# Patient Record
Sex: FEMALE | Race: WHITE | URBAN - NONMETROPOLITAN AREA
[De-identification: names, ages, dates, MRNs, and addresses within clinical notes are randomized per-mention and may not be internally consistent; named-entity substitution may affect disease eponyms.]

---

## 2023-11-05 SDOH — HEALTH STABILITY: PHYSICAL HEALTH: ON AVERAGE, HOW MANY DAYS PER WEEK DO YOU ENGAGE IN MODERATE TO STRENUOUS EXERCISE (LIKE A BRISK WALK)?: 0 DAYS

## 2023-11-05 ASSESSMENT — SOCIAL DETERMINANTS OF HEALTH (SDOH)
WITHIN THE LAST YEAR, HAVE YOU BEEN AFRAID OF YOUR PARTNER OR EX-PARTNER?: NO
WITHIN THE LAST YEAR, HAVE YOU BEEN KICKED, HIT, SLAPPED, OR OTHERWISE PHYSICALLY HURT BY YOUR PARTNER OR EX-PARTNER?: NO
WITHIN THE LAST YEAR, HAVE YOU BEEN HUMILIATED OR EMOTIONALLY ABUSED IN OTHER WAYS BY YOUR PARTNER OR EX-PARTNER?: NO
WITHIN THE LAST YEAR, HAVE TO BEEN RAPED OR FORCED TO HAVE ANY KIND OF SEXUAL ACTIVITY BY YOUR PARTNER OR EX-PARTNER?: NO

## 2023-11-07 ENCOUNTER — HOSPITAL ENCOUNTER (OUTPATIENT)
Age: 38
Discharge: HOME | End: 2023-11-10
Payer: COMMERCIAL

## 2023-11-07 ENCOUNTER — OFFICE VISIT (OUTPATIENT)
Facility: CLINIC | Age: 38
End: 2023-11-07
Payer: COMMERCIAL

## 2023-11-07 VITALS
HEART RATE: 82 BPM | TEMPERATURE: 98.3 F | RESPIRATION RATE: 16 BRPM | OXYGEN SATURATION: 98 % | HEIGHT: 66 IN | SYSTOLIC BLOOD PRESSURE: 141 MMHG | DIASTOLIC BLOOD PRESSURE: 91 MMHG | WEIGHT: 238 LBS | BODY MASS INDEX: 38.25 KG/M2

## 2023-11-07 DIAGNOSIS — Z86.39 HISTORY OF VITAMIN D DEFICIENCY: ICD-10-CM

## 2023-11-07 DIAGNOSIS — E89.0 POSTABLATIVE HYPOTHYROIDISM: ICD-10-CM

## 2023-11-07 DIAGNOSIS — I10 PRIMARY HYPERTENSION: ICD-10-CM

## 2023-11-07 DIAGNOSIS — Z13.220 SCREENING FOR CHOLESTEROL LEVEL: ICD-10-CM

## 2023-11-07 DIAGNOSIS — I10 PRIMARY HYPERTENSION: Primary | ICD-10-CM

## 2023-11-07 LAB
25(OH)D3 SERPL-MCNC: 26.1 NG/ML (ref 30–100)
ALBUMIN SERPL-MCNC: 4.1 G/DL (ref 3.4–5)
ALBUMIN/GLOB SERPL: 1.3 (ref 0.8–1.7)
ALP SERPL-CCNC: 86 U/L (ref 45–117)
ALT SERPL-CCNC: 27 U/L (ref 13–56)
ANION GAP SERPL CALC-SCNC: 6 MMOL/L (ref 3–18)
AST SERPL W P-5'-P-CCNC: 16 U/L (ref 10–38)
BASOPHILS # BLD: 0.1 K/UL (ref 0–0.1)
BASOPHILS NFR BLD: 1 % (ref 0–2)
BILIRUB SERPL-MCNC: 0.5 MG/DL (ref 0.2–1)
BUN SERPL-MCNC: 13 MG/DL (ref 7–18)
BUN/CREAT SERPL: 15 (ref 12–20)
CA-I BLD-MCNC: 9 MG/DL (ref 8.5–10.1)
CHLORIDE SERPL-SCNC: 104 MMOL/L (ref 100–111)
CHOLEST SERPL-MCNC: 183 MG/DL
CO2 SERPL-SCNC: 30 MMOL/L (ref 21–32)
CREAT SERPL-MCNC: 0.88 MG/DL (ref 0.6–1.3)
DIFFERENTIAL METHOD BLD: ABNORMAL
EOSINOPHIL # BLD: 0.1 K/UL (ref 0–0.4)
EOSINOPHIL NFR BLD: 1 % (ref 0–5)
ERYTHROCYTE [DISTWIDTH] IN BLOOD BY AUTOMATED COUNT: 13 % (ref 11.6–14.5)
GLOBULIN SER CALC-MCNC: 3.2 G/DL (ref 2–4)
GLUCOSE SERPL-MCNC: 103 MG/DL (ref 74–99)
HCT VFR BLD AUTO: 39 % (ref 35–45)
HDLC SERPL-MCNC: 50 MG/DL (ref 40–60)
HDLC SERPL: 3.7 (ref 0–5)
HGB BLD-MCNC: 13.3 G/DL (ref 12–16)
IMM GRANULOCYTES # BLD AUTO: 0 K/UL (ref 0–0.04)
IMM GRANULOCYTES NFR BLD AUTO: 0 % (ref 0–0.5)
LDLC SERPL CALC-MCNC: 102.6 MG/DL (ref 0–100)
LIPID PANEL: ABNORMAL
LYMPHOCYTES # BLD: 2.7 K/UL (ref 0.9–3.6)
LYMPHOCYTES NFR BLD: 32 % (ref 21–52)
MCH RBC QN AUTO: 30.2 PG (ref 24–34)
MCHC RBC AUTO-ENTMCNC: 34.1 G/DL (ref 31–37)
MCV RBC AUTO: 88.6 FL (ref 78–100)
MONOCYTES # BLD: 0.6 K/UL (ref 0.05–1.2)
MONOCYTES NFR BLD: 8 % (ref 3–10)
NEUTS SEG # BLD: 4.8 K/UL (ref 1.8–8)
NEUTS SEG NFR BLD: 58 % (ref 40–73)
NRBC # BLD: 0 K/UL (ref 0–0.01)
NRBC BLD-RTO: 0 PER 100 WBC
PLATELET # BLD AUTO: 218 K/UL (ref 135–420)
PMV BLD AUTO: 12.1 FL (ref 9.2–11.8)
POTASSIUM SERPL-SCNC: 3.9 MMOL/L (ref 3.5–5.5)
PROT SERPL-MCNC: 7.3 G/DL (ref 6.4–8.2)
RBC # BLD AUTO: 4.4 M/UL (ref 4.2–5.3)
SODIUM SERPL-SCNC: 140 MMOL/L (ref 136–145)
TRIGL SERPL-MCNC: 152 MG/DL
TSH SERPL DL<=0.05 MIU/L-ACNC: 0.19 UIU/ML (ref 0.36–3.74)
VLDLC SERPL CALC-MCNC: 30.4 MG/DL
WBC # BLD AUTO: 8.3 K/UL (ref 4.6–13.2)

## 2023-11-07 PROCEDURE — 85025 COMPLETE CBC W/AUTO DIFF WBC: CPT

## 2023-11-07 PROCEDURE — 3080F DIAST BP >= 90 MM HG: CPT | Performed by: NURSE PRACTITIONER

## 2023-11-07 PROCEDURE — 84443 ASSAY THYROID STIM HORMONE: CPT

## 2023-11-07 PROCEDURE — 80061 LIPID PANEL: CPT

## 2023-11-07 PROCEDURE — 82306 VITAMIN D 25 HYDROXY: CPT

## 2023-11-07 PROCEDURE — 99204 OFFICE O/P NEW MOD 45 MIN: CPT | Performed by: NURSE PRACTITIONER

## 2023-11-07 PROCEDURE — 1036F TOBACCO NON-USER: CPT | Performed by: NURSE PRACTITIONER

## 2023-11-07 PROCEDURE — G8427 DOCREV CUR MEDS BY ELIG CLIN: HCPCS | Performed by: NURSE PRACTITIONER

## 2023-11-07 PROCEDURE — 80053 COMPREHEN METABOLIC PANEL: CPT

## 2023-11-07 PROCEDURE — 36415 COLL VENOUS BLD VENIPUNCTURE: CPT

## 2023-11-07 PROCEDURE — 3077F SYST BP >= 140 MM HG: CPT | Performed by: NURSE PRACTITIONER

## 2023-11-07 PROCEDURE — G8419 CALC BMI OUT NRM PARAM NOF/U: HCPCS | Performed by: NURSE PRACTITIONER

## 2023-11-07 PROCEDURE — G8484 FLU IMMUNIZE NO ADMIN: HCPCS | Performed by: NURSE PRACTITIONER

## 2023-11-07 RX ORDER — LOSARTAN POTASSIUM 25 MG/1
25 TABLET ORAL DAILY
Qty: 30 TABLET | Refills: 0 | Status: SHIPPED | OUTPATIENT
Start: 2023-11-07 | End: 2023-11-07

## 2023-11-07 RX ORDER — LOSARTAN POTASSIUM 25 MG/1
25 TABLET ORAL DAILY
Qty: 90 TABLET | Refills: 1 | Status: SHIPPED | OUTPATIENT
Start: 2023-11-07

## 2023-11-07 SDOH — ECONOMIC STABILITY: FOOD INSECURITY: WITHIN THE PAST 12 MONTHS, THE FOOD YOU BOUGHT JUST DIDN'T LAST AND YOU DIDN'T HAVE MONEY TO GET MORE.: NEVER TRUE

## 2023-11-07 SDOH — ECONOMIC STABILITY: INCOME INSECURITY: HOW HARD IS IT FOR YOU TO PAY FOR THE VERY BASICS LIKE FOOD, HOUSING, MEDICAL CARE, AND HEATING?: NOT VERY HARD

## 2023-11-07 SDOH — ECONOMIC STABILITY: FOOD INSECURITY: WITHIN THE PAST 12 MONTHS, YOU WORRIED THAT YOUR FOOD WOULD RUN OUT BEFORE YOU GOT MONEY TO BUY MORE.: NEVER TRUE

## 2023-11-07 SDOH — ECONOMIC STABILITY: HOUSING INSECURITY
IN THE LAST 12 MONTHS, WAS THERE A TIME WHEN YOU DID NOT HAVE A STEADY PLACE TO SLEEP OR SLEPT IN A SHELTER (INCLUDING NOW)?: NO

## 2023-11-07 ASSESSMENT — PATIENT HEALTH QUESTIONNAIRE - PHQ9
SUM OF ALL RESPONSES TO PHQ QUESTIONS 1-9: 0
2. FEELING DOWN, DEPRESSED OR HOPELESS: 0
SUM OF ALL RESPONSES TO PHQ9 QUESTIONS 1 & 2: 0
1. LITTLE INTEREST OR PLEASURE IN DOING THINGS: 0
SUM OF ALL RESPONSES TO PHQ QUESTIONS 1-9: 0

## 2023-11-07 NOTE — PROGRESS NOTES
Caity Kaba (: 1985) is a 34 y.o. female patient, here for evaluation of the following chief complaint(s):  No chief complaint on file. ASSESSMENT/PLAN:  Below is the assessment and plan developed based on review of pertinent history, physical exam, labs, studies, and medications. Blood work ordered  [employer] new medication losartan 2-week blood pressure log we will follow-up in 2 weeks to monitor blood pressure and also response and to go over all labs  Will check TSH patient does see endocrinology to manage hypothyroidism  2-week follow-up        1. Primary hypertension  -     CBC with Auto Differential; Future  -     Comprehensive Metabolic Panel; Future  -     losartan (COZAAR) 25 MG tablet; Take 1 tablet by mouth daily, Disp-90 tablet, R-1Normal  2. Screening for cholesterol level  -     Lipid Panel; Future  3. Postablative hypothyroidism  -     TSH; Future  4. History of vitamin D deficiency  -     Vitamin D 25 Hydroxy; Future    No results found for any visits on 23. Return in about 2 weeks (around 2023). I have discussed the diagnosis with the patient and the intended plan as seen in the above orders. Questions were answered concerning future plans. I have discussed medication side effects and warnings with the patient as well. I have reviewed the plan of care with the patient, accepted their input and they are in agreement with the treatment goals. Previous lab and imaging results were reviewed by me. I have performed a medically appropriate exam, ordering tests and medications, counseling and educating, and documenting in the EMR     I recommend this patient have regular follow-up appointments every  6 months for general health evaluations and management   Sooner if indicated     Past Medical History  History reviewed. No pertinent past medical history.        SUBJECTIVE/OBJECTIVE:    HPI: 47-year-old female presents to the office to establish for primary care

## 2023-11-08 NOTE — RESULT ENCOUNTER NOTE
Lab work reviewed noted mild abnormal findings noncritical urgent  This patient has a follow-up appointment November 21 for reevaluation of blood pressure and we will discuss all labs at this time

## 2023-12-07 ENCOUNTER — OFFICE VISIT (OUTPATIENT)
Facility: CLINIC | Age: 38
End: 2023-12-07
Payer: COMMERCIAL

## 2023-12-07 VITALS
OXYGEN SATURATION: 98 % | HEIGHT: 66 IN | WEIGHT: 232.8 LBS | SYSTOLIC BLOOD PRESSURE: 130 MMHG | DIASTOLIC BLOOD PRESSURE: 84 MMHG | RESPIRATION RATE: 20 BRPM | BODY MASS INDEX: 37.41 KG/M2 | HEART RATE: 75 BPM

## 2023-12-07 DIAGNOSIS — Z13.29 SCREENING FOR ENDOCRINE DISORDER: ICD-10-CM

## 2023-12-07 DIAGNOSIS — E89.0 POSTABLATIVE HYPOTHYROIDISM: Primary | ICD-10-CM

## 2023-12-07 DIAGNOSIS — Z12.31 ENCOUNTER FOR SCREENING MAMMOGRAM FOR MALIGNANT NEOPLASM OF BREAST: ICD-10-CM

## 2023-12-07 DIAGNOSIS — R73.9 BLOOD GLUCOSE ELEVATED: ICD-10-CM

## 2023-12-07 DIAGNOSIS — I10 PRIMARY HYPERTENSION: ICD-10-CM

## 2023-12-07 LAB — HBA1C MFR BLD: 5.4 %

## 2023-12-07 PROCEDURE — 3075F SYST BP GE 130 - 139MM HG: CPT | Performed by: NURSE PRACTITIONER

## 2023-12-07 PROCEDURE — 99213 OFFICE O/P EST LOW 20 MIN: CPT | Performed by: NURSE PRACTITIONER

## 2023-12-07 PROCEDURE — 3079F DIAST BP 80-89 MM HG: CPT | Performed by: NURSE PRACTITIONER

## 2023-12-07 PROCEDURE — G8419 CALC BMI OUT NRM PARAM NOF/U: HCPCS | Performed by: NURSE PRACTITIONER

## 2023-12-07 PROCEDURE — 83036 HEMOGLOBIN GLYCOSYLATED A1C: CPT | Performed by: NURSE PRACTITIONER

## 2023-12-07 PROCEDURE — G8484 FLU IMMUNIZE NO ADMIN: HCPCS | Performed by: NURSE PRACTITIONER

## 2023-12-07 PROCEDURE — G8427 DOCREV CUR MEDS BY ELIG CLIN: HCPCS | Performed by: NURSE PRACTITIONER

## 2023-12-07 PROCEDURE — 1036F TOBACCO NON-USER: CPT | Performed by: NURSE PRACTITIONER

## 2023-12-07 RX ORDER — LEVOTHYROXINE SODIUM 0.2 MG/1
200 TABLET ORAL DAILY
COMMUNITY

## 2023-12-07 ASSESSMENT — PATIENT HEALTH QUESTIONNAIRE - PHQ9
SUM OF ALL RESPONSES TO PHQ9 QUESTIONS 1 & 2: 0
SUM OF ALL RESPONSES TO PHQ QUESTIONS 1-9: 0
1. LITTLE INTEREST OR PLEASURE IN DOING THINGS: 0
2. FEELING DOWN, DEPRESSED OR HOPELESS: 0

## 2023-12-11 DIAGNOSIS — I10 PRIMARY HYPERTENSION: ICD-10-CM

## 2023-12-11 RX ORDER — LOSARTAN POTASSIUM 25 MG/1
25 TABLET ORAL DAILY
Qty: 90 TABLET | Refills: 0 | Status: SHIPPED | OUTPATIENT
Start: 2023-12-11

## 2023-12-11 NOTE — TELEPHONE ENCOUNTER
Pt called and said when she had appt on 12/7/23 she forgot to ask about refilling her BP medication.

## 2024-04-05 DIAGNOSIS — I10 PRIMARY HYPERTENSION: ICD-10-CM

## 2024-04-05 RX ORDER — LOSARTAN POTASSIUM 25 MG/1
25 TABLET ORAL DAILY
Qty: 90 TABLET | Refills: 0 | Status: SHIPPED | OUTPATIENT
Start: 2024-04-05

## 2024-04-09 ENCOUNTER — OFFICE VISIT (OUTPATIENT)
Facility: CLINIC | Age: 39
End: 2024-04-09
Payer: COMMERCIAL

## 2024-04-09 VITALS
RESPIRATION RATE: 16 BRPM | HEIGHT: 66 IN | BODY MASS INDEX: 34.78 KG/M2 | SYSTOLIC BLOOD PRESSURE: 127 MMHG | TEMPERATURE: 98.1 F | HEART RATE: 76 BPM | OXYGEN SATURATION: 98 % | DIASTOLIC BLOOD PRESSURE: 74 MMHG | WEIGHT: 216.4 LBS

## 2024-04-09 DIAGNOSIS — N63.21 MASS OF UPPER OUTER QUADRANT OF LEFT BREAST: Primary | ICD-10-CM

## 2024-04-09 DIAGNOSIS — M72.0 DUPUYTREN'S CONTRACTURE: ICD-10-CM

## 2024-04-09 PROCEDURE — 99213 OFFICE O/P EST LOW 20 MIN: CPT | Performed by: NURSE PRACTITIONER

## 2024-04-09 PROCEDURE — G8417 CALC BMI ABV UP PARAM F/U: HCPCS | Performed by: NURSE PRACTITIONER

## 2024-04-09 PROCEDURE — 1036F TOBACCO NON-USER: CPT | Performed by: NURSE PRACTITIONER

## 2024-04-09 PROCEDURE — G8427 DOCREV CUR MEDS BY ELIG CLIN: HCPCS | Performed by: NURSE PRACTITIONER

## 2024-04-09 ASSESSMENT — PATIENT HEALTH QUESTIONNAIRE - PHQ9
SUM OF ALL RESPONSES TO PHQ QUESTIONS 1-9: 0
1. LITTLE INTEREST OR PLEASURE IN DOING THINGS: NOT AT ALL
SUM OF ALL RESPONSES TO PHQ QUESTIONS 1-9: 0
SUM OF ALL RESPONSES TO PHQ QUESTIONS 1-9: 0
SUM OF ALL RESPONSES TO PHQ9 QUESTIONS 1 & 2: 0
SUM OF ALL RESPONSES TO PHQ QUESTIONS 1-9: 0
2. FEELING DOWN, DEPRESSED OR HOPELESS: NOT AT ALL

## 2024-04-09 NOTE — PROGRESS NOTES
Lay Fish (: 1985) is a 39 y.o. female patient, here for evaluation of the following chief complaint(s):  Hand Pain and Breast Mass       ASSESSMENT/PLAN:  Below is the assessment and plan developed based on review of pertinent history, physical exam, labs, studies, and medications.        Mammogram diagnostic ordered  Ultrasound diagnostic ordered patient is aware and number to schedule these appointments given to patient.    Concerning due between structure referral to Dr. Bear Lopez I also recommend that the patient wear a finger splint especially at night.  Patient is aware I do not recommend she wear this .      Will follow-up on all results as available            1. Mass of upper outer quadrant of left breast  -     TRANG IQRA DIGITAL DIAGNOSTIC BILATERAL; Future  -     TRANG IQRA DIGITAL DIAGNOSTIC UNILATERAL LEFT; Future  2. Dupuytren's contracture  -     [facility] - Shamir Lopez DO, Hand Surgery, [place] ([facility])    No results found for any visits on 24.     No follow-ups on file.      I have discussed the diagnosis with the patient and the intended plan as seen in the above orders.  Questions were answered concerning future plans.  I have discussed medication side effects and warnings with the patient as well. I have reviewed the plan of care with the patient, accepted their input and they are in agreement with the treatment goals. Previous lab and imaging results were reviewed by me.     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

## 2024-04-09 NOTE — PROGRESS NOTES
Chief Complaint   Patient presents with    Hand Pain    Breast Mass       \"Have you been to the ER, urgent care clinic since your last visit?  Hospitalized since your last visit?\"    NO    “Have you seen or consulted any other health care providers outside of [facility] since your last visit?”    NO        “Have you had a pap smear?”    YES       Date of last Cervical Cancer screen (HPV or PAP): 6/7/2012

## 2024-04-22 DIAGNOSIS — N63.21 MASS OF UPPER OUTER QUADRANT OF LEFT BREAST: ICD-10-CM

## 2024-04-22 NOTE — RESULT ENCOUNTER NOTE
Pt has US breast  Recommended screening every 6 months   I would like to discuss the testing recommended, in telephone visit please have her schedule

## 2024-04-24 ENCOUNTER — OFFICE VISIT (OUTPATIENT)
Age: 39
End: 2024-04-24

## 2024-04-24 ENCOUNTER — TELEPHONE (OUTPATIENT)
Facility: CLINIC | Age: 39
End: 2024-04-24

## 2024-04-24 VITALS — HEIGHT: 66 IN | BODY MASS INDEX: 34.93 KG/M2

## 2024-04-24 DIAGNOSIS — M67.441 GANGLION OF FLEXOR TENDON SHEATH OF RIGHT RING FINGER: Primary | ICD-10-CM

## 2024-04-24 DIAGNOSIS — M79.641 RIGHT HAND PAIN: ICD-10-CM

## 2024-04-24 RX ORDER — LIDOCAINE HYDROCHLORIDE 10 MG/ML
0.5 INJECTION, SOLUTION INFILTRATION; PERINEURAL ONCE
Status: COMPLETED | OUTPATIENT
Start: 2024-04-24 | End: 2024-04-24

## 2024-04-24 RX ADMIN — LIDOCAINE HYDROCHLORIDE 0.5 ML: 10 INJECTION, SOLUTION INFILTRATION; PERINEURAL at 11:09

## 2024-04-24 NOTE — PROGRESS NOTES
nonoperative treatment first.    No follow-ups on file.     Plan was reviewed with patient, who verbalized agreement and understanding of the plan    [facility] - [facility]  [address], [address]  [place] [id number]   OFFICE PROCEDURE PROGRESS NOTE        Chart reviewed for the following:   Shamir LYNCH DO, have reviewed the History, Physical and updated the Allergic reactions for Lay Fish     TIME OUT performed immediately prior to start of procedure:   Shamir LYNCH DO, have performed the following reviews on Lay Fish prior to the start of the procedure:            * Patient was identified by name and date of birth   * Agreement on procedure being performed was verified  * Risks and Benefits explained to the patient  * Procedure site verified and marked as necessary  * Patient was positioned for comfort  * Consent was signed and verified     Time: 11:09 AM      Date of procedure: 4/26/2024    Procedure performed by:  Shamir Lopez DO    Provider assisted by: Mae Marx MA    Patient assisted by: self    How tolerated by patient: tolerated    Post Procedural Pain Scale:0    Comments: none    Procedure:  After consent was obtained, using sterile technique the Right ring finger was prepped. Local anesthetic used: 1% Lidocaine Kenalog 5 mg and was then injected and the needle withdrawn.  The procedure was well tolerated.  The patient is asked to continue to rest the area for a few more days before resuming regular activities.  It may be more painful for the first 1-2 days.  Watch for fever, or increased swelling or persistent pain in the joint. Call or return to clinic prn if such symptoms occur or there is failure to improve as anticipated.     Note: This note was completed using voice recognition software.  Any typographical/name errors or mistakes are unintentional.

## 2024-04-29 NOTE — RESULT ENCOUNTER NOTE
Appointment May 1 to discuss ultrasound findings and also management of MRI/breast screening alternating every 6 months.

## 2024-05-06 NOTE — PROGRESS NOTES
Lay Fish, was evaluated through a synchronous (real-time) audio-video encounter. The patient (or guardian if applicable) is aware that this is a billable service, which includes applicable co-pays. This Virtual Visit was conducted with patient's (and/or legal guardian's) consent. Patient identification was verified, and a caregiver was present when appropriate.   The patient was located at Home: [address] [id number]  Provider was located at Facility (Appt Dept): [address] [place], [place] [id number]  Confirm you are appropriately licensed, registered, or certified to deliver care in the state where the patient is located as indicated above. If you are not or unsure, please re-schedule the visit: Yes, I confirm.     Lay Fish (:  1985) is a Established patient, presenting virtually for evaluation of the following:review plan for continued testing mri mammo     Assessment & Plan   Below is the assessment and plan developed based on review of pertinent history, physical exam, labs, studies, and medications.        Discussed and plan     1. Mass of upper outer quadrant of left breast  -     MRI BREAST BILATERAL WO CONTRAST; Future  2. Family history of breast cancer  3. Abnormal mammogram  Comments:  MRI oct 2024 then yearly   screen mammo 2025 than yearly  Orders:  -     MRI BREAST BILATERAL WO CONTRAST; Future    No follow-ups on file.       Subjective   HPI 39-year-old female presents on a virtual visit to discuss the plan for follow-up mammograms and MRIs of the breast.  The patient had a mammogram on     Recommendation guidelines per radiologist is the patient should have MRIs of the breast every year with annual screening mammograms every year    The patient states she is unsure if she has had genetic testing for breast cancer  Patient states her sister was just diagnosed at the age of 46 with breast cancer she is BRCA negative  Patient states her paternal aunt was

## 2024-05-07 ENCOUNTER — TELEMEDICINE (OUTPATIENT)
Facility: CLINIC | Age: 39
End: 2024-05-07
Payer: COMMERCIAL

## 2024-05-07 DIAGNOSIS — R92.8 ABNORMAL MAMMOGRAM: ICD-10-CM

## 2024-05-07 DIAGNOSIS — Z80.3 FAMILY HISTORY OF BREAST CANCER: ICD-10-CM

## 2024-05-07 DIAGNOSIS — N63.21 MASS OF UPPER OUTER QUADRANT OF LEFT BREAST: Primary | ICD-10-CM

## 2024-05-07 PROCEDURE — G8417 CALC BMI ABV UP PARAM F/U: HCPCS | Performed by: NURSE PRACTITIONER

## 2024-05-07 PROCEDURE — G8428 CUR MEDS NOT DOCUMENT: HCPCS | Performed by: NURSE PRACTITIONER

## 2024-05-07 PROCEDURE — 99213 OFFICE O/P EST LOW 20 MIN: CPT | Performed by: NURSE PRACTITIONER

## 2024-05-07 PROCEDURE — 1036F TOBACCO NON-USER: CPT | Performed by: NURSE PRACTITIONER

## 2024-05-07 ASSESSMENT — PATIENT HEALTH QUESTIONNAIRE - PHQ9
2. FEELING DOWN, DEPRESSED OR HOPELESS: NOT AT ALL
SUM OF ALL RESPONSES TO PHQ QUESTIONS 1-9: 0
SUM OF ALL RESPONSES TO PHQ QUESTIONS 1-9: 0
SUM OF ALL RESPONSES TO PHQ9 QUESTIONS 1 & 2: 0
SUM OF ALL RESPONSES TO PHQ QUESTIONS 1-9: 0
SUM OF ALL RESPONSES TO PHQ QUESTIONS 1-9: 0
1. LITTLE INTEREST OR PLEASURE IN DOING THINGS: NOT AT ALL

## 2024-05-07 NOTE — PROGRESS NOTES
Chief Complaint   Patient presents with    Follow-up     Chronic disease       \"Have you been to the ER, urgent care clinic since your last visit?  Hospitalized since your last visit?\"    NO    “Have you seen or consulted any other health care providers outside of [facility] since your last visit?”    NO        “Have you had a pap smear?”    NO

## 2024-07-11 ENCOUNTER — HOSPITAL ENCOUNTER (OUTPATIENT)
Age: 39
Discharge: HOME | End: 2024-07-11
Payer: COMMERCIAL

## 2024-07-11 ENCOUNTER — OFFICE VISIT (OUTPATIENT)
Facility: CLINIC | Age: 39
End: 2024-07-11
Payer: COMMERCIAL

## 2024-07-11 ENCOUNTER — TELEPHONE (OUTPATIENT)
Facility: CLINIC | Age: 39
End: 2024-07-11

## 2024-07-11 VITALS
SYSTOLIC BLOOD PRESSURE: 128 MMHG | WEIGHT: 198 LBS | HEART RATE: 88 BPM | RESPIRATION RATE: 18 BRPM | TEMPERATURE: 97.4 F | HEIGHT: 66 IN | DIASTOLIC BLOOD PRESSURE: 85 MMHG | BODY MASS INDEX: 31.82 KG/M2 | OXYGEN SATURATION: 98 %

## 2024-07-11 DIAGNOSIS — S69.91XA INJURY OF RIGHT WRIST, INITIAL ENCOUNTER: Primary | ICD-10-CM

## 2024-07-11 DIAGNOSIS — S69.91XA INJURY OF RIGHT WRIST, INITIAL ENCOUNTER: ICD-10-CM

## 2024-07-11 DIAGNOSIS — I10 PRIMARY HYPERTENSION: ICD-10-CM

## 2024-07-11 DIAGNOSIS — E89.0 POSTABLATIVE HYPOTHYROIDISM: ICD-10-CM

## 2024-07-11 PROCEDURE — 99213 OFFICE O/P EST LOW 20 MIN: CPT | Performed by: NURSE PRACTITIONER

## 2024-07-11 PROCEDURE — 1036F TOBACCO NON-USER: CPT | Performed by: NURSE PRACTITIONER

## 2024-07-11 PROCEDURE — 3074F SYST BP LT 130 MM HG: CPT | Performed by: NURSE PRACTITIONER

## 2024-07-11 PROCEDURE — G8417 CALC BMI ABV UP PARAM F/U: HCPCS | Performed by: NURSE PRACTITIONER

## 2024-07-11 PROCEDURE — G8427 DOCREV CUR MEDS BY ELIG CLIN: HCPCS | Performed by: NURSE PRACTITIONER

## 2024-07-11 PROCEDURE — 3079F DIAST BP 80-89 MM HG: CPT | Performed by: NURSE PRACTITIONER

## 2024-07-11 PROCEDURE — 73110 X-RAY EXAM OF WRIST: CPT

## 2024-07-11 RX ORDER — LOSARTAN POTASSIUM 25 MG/1
25 TABLET ORAL DAILY
Qty: 90 TABLET | Refills: 1 | Status: SHIPPED | OUTPATIENT
Start: 2024-07-11 | End: 2025-01-07

## 2024-07-11 NOTE — PROGRESS NOTES
Chief Complaint   Patient presents with    Other     Fell on right wrist 2 days ago, applied wrist brace and fingers started to become painful last night       \"Have you been to the ER, urgent care clinic since your last visit?  Hospitalized since your last visit?\"    NO    “Have you seen or consulted any other health care providers outside of [facility] since your last visit?”    NO        “Have you had a pap smear?”    YES -     Date of last Cervical Cancer screen (HPV or PAP): 6/7/2012          
efforts are full and unlabored.  Extremities: Bilateral upper extremities: There is no clubbing, cyanosis, or edema.  3+ peripheral pulses.cap refill less than 2 seconds.  Right wrist no deformity noted, equal hand grasp, range of motion normal to all fingers.  Limited range of motion noted to the wrist, pain reported.  There is no erythema or inflammation noted to the right wrist.  Neurological:    There is no obvious focal sensory or motor deficits.           I recommend this patient have regular follow-up appointments for general health evaluations and management, sooner appointments if indicated for acute concerns    This chart was prepared using voice recognition software and may contain unintended word substitution errors    I have discussed the diagnosis with the patient and the intended plan as seen in the above orders.  Questions were answered concerning future plans.  I have discussed medication side effects and warnings with the patient as well. I have reviewed the plan of care with the patient, accepted their input and they are in agreement with the treatment goals. Previous lab and imaging results were reviewed by me.           An electronic signature was used to authenticate this note.  -- JARETT Guerra

## 2024-07-12 ENCOUNTER — TELEPHONE (OUTPATIENT)
Facility: CLINIC | Age: 39
End: 2024-07-12

## 2024-07-12 DIAGNOSIS — S69.91XA INJURY OF RIGHT WRIST, INITIAL ENCOUNTER: Primary | ICD-10-CM

## 2024-07-12 NOTE — RESULT ENCOUNTER NOTE
Called patient and notified of referral and suggested patient call Dr. Lopez's office since she was an established patient and may get an appointment sooner.

## 2024-07-12 NOTE — RESULT ENCOUNTER NOTE
Called patient and notified of x ray result. States she would like referral to Shamir Lopez who she has seen in the past if possible.

## 2024-07-12 NOTE — RESULT ENCOUNTER NOTE
No acute fractures or dislocations, joint space narrowing could mean mild osteoarthritis type changes.  Patient needs to decide if she wants further evaluation by specialty care

## 2024-09-20 ENCOUNTER — OFFICE VISIT (OUTPATIENT)
Facility: CLINIC | Age: 39
End: 2024-09-20
Payer: COMMERCIAL

## 2024-09-20 VITALS
HEIGHT: 66 IN | HEART RATE: 83 BPM | TEMPERATURE: 98 F | OXYGEN SATURATION: 98 % | WEIGHT: 196 LBS | DIASTOLIC BLOOD PRESSURE: 83 MMHG | RESPIRATION RATE: 18 BRPM | SYSTOLIC BLOOD PRESSURE: 127 MMHG | BODY MASS INDEX: 31.5 KG/M2

## 2024-09-20 DIAGNOSIS — J06.9 VIRAL URI: Primary | ICD-10-CM

## 2024-09-20 DIAGNOSIS — J02.9 SORE THROAT: ICD-10-CM

## 2024-09-20 LAB
EXP DATE SOLUTION: NORMAL
EXP DATE SWAB: NORMAL
EXPIRATION DATE: NORMAL
GROUP A STREP ANTIGEN, POC: NEGATIVE
LOT NUMBER POC: NORMAL
LOT NUMBER SOLUTION: NORMAL
LOT NUMBER SWAB: NORMAL
SARS-COV-2 RNA, POC: NEGATIVE
VALID INTERNAL CONTROL, POC: YES

## 2024-09-20 PROCEDURE — G8427 DOCREV CUR MEDS BY ELIG CLIN: HCPCS | Performed by: NURSE PRACTITIONER

## 2024-09-20 PROCEDURE — G8417 CALC BMI ABV UP PARAM F/U: HCPCS | Performed by: NURSE PRACTITIONER

## 2024-09-20 PROCEDURE — 1036F TOBACCO NON-USER: CPT | Performed by: NURSE PRACTITIONER

## 2024-09-20 PROCEDURE — 87880 STREP A ASSAY W/OPTIC: CPT | Performed by: NURSE PRACTITIONER

## 2024-09-20 PROCEDURE — 99213 OFFICE O/P EST LOW 20 MIN: CPT | Performed by: NURSE PRACTITIONER

## 2024-09-20 PROCEDURE — 87635 SARS-COV-2 COVID-19 AMP PRB: CPT | Performed by: NURSE PRACTITIONER

## 2024-09-20 RX ORDER — BENZONATATE 200 MG/1
200 CAPSULE ORAL 3 TIMES DAILY PRN
Qty: 21 CAPSULE | Refills: 0 | Status: SHIPPED | OUTPATIENT
Start: 2024-09-20 | End: 2024-09-27

## 2024-09-20 RX ORDER — CODEINE PHOSPHATE/GUAIFENESIN 10-100MG/5
5 LIQUID (ML) ORAL 2 TIMES DAILY PRN
Qty: 30 ML | Refills: 0 | Status: SHIPPED | OUTPATIENT
Start: 2024-09-20 | End: 2024-09-23

## 2024-09-24 ENCOUNTER — TELEPHONE (OUTPATIENT)
Facility: CLINIC | Age: 39
End: 2024-09-24

## 2024-09-24 DIAGNOSIS — J02.9 SORE THROAT: Primary | ICD-10-CM

## 2024-09-24 RX ORDER — AZITHROMYCIN 250 MG/1
TABLET, FILM COATED ORAL
Qty: 6 TABLET | Refills: 0 | Status: SHIPPED | OUTPATIENT
Start: 2024-09-24 | End: 2024-10-04

## 2024-09-24 NOTE — TELEPHONE ENCOUNTER
Called patient and notified of Antibiotic and to follow up in office if symptoms persist, or to seek emergency care if symptoms worsen.

## 2024-09-24 NOTE — TELEPHONE ENCOUNTER
Pt called states her cough and throat is worst- pt daughter actually went in to urgent care last week and was diagnosed with croot. Pt is wondering if there something else to be given for her symptoms? Or what would ajit like to do? Please call pt and advise- she was seen recently

## 2024-10-11 ENCOUNTER — OFFICE VISIT (OUTPATIENT)
Facility: CLINIC | Age: 39
End: 2024-10-11
Payer: COMMERCIAL

## 2024-10-11 VITALS
HEIGHT: 66 IN | SYSTOLIC BLOOD PRESSURE: 139 MMHG | DIASTOLIC BLOOD PRESSURE: 87 MMHG | WEIGHT: 196 LBS | OXYGEN SATURATION: 96 % | HEART RATE: 77 BPM | TEMPERATURE: 98.1 F | BODY MASS INDEX: 31.5 KG/M2 | RESPIRATION RATE: 18 BRPM

## 2024-10-11 DIAGNOSIS — J02.9 SORE THROAT: Primary | ICD-10-CM

## 2024-10-11 PROCEDURE — 87880 STREP A ASSAY W/OPTIC: CPT | Performed by: NURSE PRACTITIONER

## 2024-10-11 PROCEDURE — 99213 OFFICE O/P EST LOW 20 MIN: CPT | Performed by: NURSE PRACTITIONER

## 2024-10-11 PROCEDURE — 1036F TOBACCO NON-USER: CPT | Performed by: NURSE PRACTITIONER

## 2024-10-11 PROCEDURE — 87635 SARS-COV-2 COVID-19 AMP PRB: CPT | Performed by: NURSE PRACTITIONER

## 2024-10-11 PROCEDURE — G8427 DOCREV CUR MEDS BY ELIG CLIN: HCPCS | Performed by: NURSE PRACTITIONER

## 2024-10-11 PROCEDURE — G8484 FLU IMMUNIZE NO ADMIN: HCPCS | Performed by: NURSE PRACTITIONER

## 2024-10-11 PROCEDURE — G8417 CALC BMI ABV UP PARAM F/U: HCPCS | Performed by: NURSE PRACTITIONER

## 2024-10-11 RX ORDER — AZITHROMYCIN 250 MG/1
TABLET, FILM COATED ORAL
Qty: 6 TABLET | Refills: 0 | Status: SHIPPED | OUTPATIENT
Start: 2024-10-11 | End: 2024-10-21

## 2024-10-11 NOTE — PROGRESS NOTES
Lay Fish (: 1985) is a 39 y.o. female patient, here for evaluation of the following chief complaint(s):  Other (Sore throat, no fever, growth on the roof of mouth)       ASSESSMENT/PLAN:  Assessment & Plan  Sore throat  Exam stable  No atypical fine  vital signs stable  Continue medications over-the-counter recommend the patient follow-up next week if not improved for further discussion evaluation and management    Orders:    AMB POC RAPID STREP A    azithromycin (ZITHROMAX) 250 MG tablet; 500mg on day 1 followed by 250mg on days 2 - 5                 No follow-ups on file.      SUBJECTIVE/OBJECTIVE:  1 week of sore throat worse to his left side patient states she is using Chloraseptic and ibuprofen denies any other symptoms such as cough fever chills sweats sinus congestion ear pain nausea vomiting.  Patient does have small children  In patient's children were ill last week                  /87 (Site: Left Upper Arm, Position: Sitting)   Pulse 77   Temp 98.1 °F (36.7 °C) (Temporal)   Resp 18   Ht 1.676 m (5' 6\")   Wt 88.9 kg (196 lb)   SpO2 96%   BMI 31.64 kg/m²        Physical Exam   General:  alert, cooperative, well appearing, in no apparent distress.  Eyes:  Pupils are equally round and reactive to light with accommodation.    ENT:    The tongue and mucous membranes are pink and moist without lesions.  The pharynx is non-erythematous without exudates.  Neck:  There is normal range of motion.  The thyroid is normal size without nodules or masses.  There is no lymphadenopathy.  Tender to palpate left side around tonsil area on neck no fluctuance no erythema no internal tooth pain  Lungs: Inspiratory and expiratory efforts are full and unlabored.        I recommend this patient have regular follow-up appointments for general health evaluations and management, sooner appointments if indicated for acute concerns    This chart was prepared using voice recognition software and may contain

## 2024-10-11 NOTE — PROGRESS NOTES
Chief Complaint   Patient presents with    Other     Sore throat, no fever, growth on the roof of mouth       \"Have you been to the ER, urgent care clinic since your last visit?  Hospitalized since your last visit?\"    NO    “Have you seen or consulted any other health care providers outside of [facility] since your last visit?”    NO        “Have you had a pap smear?”    NO    Date of last Cervical Cancer screen (HPV or PAP): 6/7/2012

## 2025-01-17 DIAGNOSIS — I10 PRIMARY HYPERTENSION: ICD-10-CM

## 2025-01-21 RX ORDER — LOSARTAN POTASSIUM 25 MG/1
25 TABLET ORAL DAILY
Qty: 90 TABLET | Refills: 0 | Status: SHIPPED | OUTPATIENT
Start: 2025-01-21

## 2025-03-07 ENCOUNTER — OFFICE VISIT (OUTPATIENT)
Facility: CLINIC | Age: 40
End: 2025-03-07
Payer: COMMERCIAL

## 2025-03-07 VITALS
RESPIRATION RATE: 18 BRPM | HEART RATE: 91 BPM | TEMPERATURE: 97 F | HEIGHT: 66 IN | DIASTOLIC BLOOD PRESSURE: 77 MMHG | WEIGHT: 193 LBS | BODY MASS INDEX: 31.02 KG/M2 | OXYGEN SATURATION: 98 % | SYSTOLIC BLOOD PRESSURE: 113 MMHG

## 2025-03-07 DIAGNOSIS — M54.16 LUMBAR RADICULOPATHY: Primary | ICD-10-CM

## 2025-03-07 PROCEDURE — 99213 OFFICE O/P EST LOW 20 MIN: CPT | Performed by: NURSE PRACTITIONER

## 2025-03-07 PROCEDURE — G8417 CALC BMI ABV UP PARAM F/U: HCPCS | Performed by: NURSE PRACTITIONER

## 2025-03-07 PROCEDURE — 96372 THER/PROPH/DIAG INJ SC/IM: CPT | Performed by: NURSE PRACTITIONER

## 2025-03-07 PROCEDURE — G8427 DOCREV CUR MEDS BY ELIG CLIN: HCPCS | Performed by: NURSE PRACTITIONER

## 2025-03-07 PROCEDURE — 1036F TOBACCO NON-USER: CPT | Performed by: NURSE PRACTITIONER

## 2025-03-07 RX ORDER — PREDNISONE 20 MG/1
20 TABLET ORAL 2 TIMES DAILY
Qty: 10 TABLET | Refills: 0 | Status: SHIPPED | OUTPATIENT
Start: 2025-03-07 | End: 2025-03-12

## 2025-03-07 RX ORDER — TRAMADOL HYDROCHLORIDE 50 MG/1
50 TABLET ORAL EVERY 8 HOURS PRN
Qty: 15 TABLET | Refills: 0 | Status: SHIPPED | OUTPATIENT
Start: 2025-03-07 | End: 2025-03-12

## 2025-03-07 RX ORDER — KETOROLAC TROMETHAMINE 30 MG/ML
30 INJECTION, SOLUTION INTRAMUSCULAR; INTRAVENOUS ONCE
Status: COMPLETED | OUTPATIENT
Start: 2025-03-07 | End: 2025-03-07

## 2025-03-07 RX ADMIN — KETOROLAC TROMETHAMINE 30 MG: 30 INJECTION, SOLUTION INTRAMUSCULAR; INTRAVENOUS at 15:58

## 2025-03-07 SDOH — ECONOMIC STABILITY: FOOD INSECURITY: WITHIN THE PAST 12 MONTHS, YOU WORRIED THAT YOUR FOOD WOULD RUN OUT BEFORE YOU GOT MONEY TO BUY MORE.: NEVER TRUE

## 2025-03-07 SDOH — ECONOMIC STABILITY: FOOD INSECURITY: WITHIN THE PAST 12 MONTHS, THE FOOD YOU BOUGHT JUST DIDN'T LAST AND YOU DIDN'T HAVE MONEY TO GET MORE.: NEVER TRUE

## 2025-03-07 ASSESSMENT — PATIENT HEALTH QUESTIONNAIRE - PHQ9
SUM OF ALL RESPONSES TO PHQ QUESTIONS 1-9: 0
2. FEELING DOWN, DEPRESSED OR HOPELESS: NOT AT ALL
SUM OF ALL RESPONSES TO PHQ QUESTIONS 1-9: 0
1. LITTLE INTEREST OR PLEASURE IN DOING THINGS: NOT AT ALL
SUM OF ALL RESPONSES TO PHQ QUESTIONS 1-9: 0
SUM OF ALL RESPONSES TO PHQ QUESTIONS 1-9: 0

## 2025-03-07 NOTE — PROGRESS NOTES
Chief Complaint   Patient presents with    Other     Lower back pain x 5 days, pain radiates down right leg, hurts to lift left leg, Son jumped on her back       \"Have you been to the ER, urgent care clinic since your last visit?  Hospitalized since your last visit?\"    NO    “Have you seen or consulted any other health care providers outside our system since your last visit?”    NO     “Have you had a pap smear?”    NO  Chief Complaint   Patient presents with    Other     Lower back pain x 5 days, pain radiates down right leg, hurts to lift left leg, Son jumped on her back       \"Have you been to the ER, urgent care clinic since your last visit?  Hospitalized since your last visit?\"    NO    “Have you seen or consulted any other health care providers outside our system since your last visit?”    NO     “Have you had a pap smear?”    NO    Date of last Cervical Cancer screen (HPV or PAP): 6/7/2012               Date of last Cervical Cancer screen (HPV or PAP): 6/7/2012

## 2025-03-07 NOTE — PROGRESS NOTES
Lay Fish (: 1985) is a 39 y.o. female patient, here for evaluation of the following chief complaint(s):  Other (Lower back pain x 5 days, pain radiates down right leg, hurts to lift left leg, Son jumped on her back)       ASSESSMENT/PLAN:  Assessment & Plan  Lumbar radiculopathy    Toradol 30 m g in office  Patient is aware she should not take any more nonsteroidal anti-inflammatory  Patient did take 800 of ibuprofen this morning  Prednisone as prescribed  Tramadol as prescribed and directed  Patient is aware tramadol can cause drowsiness constipation should not drink or drive  Orders:    predniSONE (DELTASONE) 20 MG tablet; Take 1 tablet by mouth 2 times daily for 5 days    traMADol (ULTRAM) 50 MG tablet; Take 1 tablet by mouth every 8 hours as needed for Pain for up to 5 days. Intended supply: 5 days. Take lowest dose possible to manage pain Max Daily Amount: 150 mg    ketorolac (TORADOL) injection 30 mg                 No follow-ups on file.      SUBJECTIVE/OBJECTIVE:    39-year-old female presents for:acute concern   Patient has had 5 days of lower back pain radiating to the right right buttock and into the right's side of the thigh to the calf.  Patient states the foot does not have numbness or pain patient states the pain radiating into her leg is at tingling numb feeling.  Most of the pain she reports is centered at the lower right back side.  Patient was on the floor playing with her child she said her son jumped on her back playing    Patient does have a history of lower back problems during pregnancy  Denies difficulty urinating or defecating, no report of neurological deficit such as right foot drop.  Patient is able to ambulate but states restricted due to pain  No nausea vomiting diarrhea abdominal pain weakness chest pain    Lmp normal          /77 (Site: Right Upper Arm, Position: Sitting)   Pulse 91   Temp 97 °F (36.1 °C) (Temporal)   Resp 18   Ht 1.676 m (5' 6\")   Wt

## 2025-04-03 ENCOUNTER — OFFICE VISIT (OUTPATIENT)
Facility: CLINIC | Age: 40
End: 2025-04-03
Payer: COMMERCIAL

## 2025-04-03 VITALS
RESPIRATION RATE: 18 BRPM | TEMPERATURE: 98.3 F | BODY MASS INDEX: 29.57 KG/M2 | OXYGEN SATURATION: 99 % | HEIGHT: 66 IN | WEIGHT: 184 LBS | SYSTOLIC BLOOD PRESSURE: 113 MMHG | DIASTOLIC BLOOD PRESSURE: 78 MMHG | HEART RATE: 102 BPM

## 2025-04-03 DIAGNOSIS — J20.9 ACUTE BRONCHITIS, UNSPECIFIED ORGANISM: Primary | ICD-10-CM

## 2025-04-03 LAB
EXP DATE SOLUTION: NORMAL
EXP DATE SWAB: NORMAL
EXPIRATION DATE: NORMAL
INFLUENZA A ANTIGEN, POC: NEGATIVE
INFLUENZA B ANTIGEN, POC: NEGATIVE
LOT NUMBER POC: NORMAL
LOT NUMBER SOLUTION: NORMAL
LOT NUMBER SWAB: NORMAL
SARS-COV-2 RNA, POC: NEGATIVE
VALID INTERNAL CONTROL, POC: YES

## 2025-04-03 PROCEDURE — G8417 CALC BMI ABV UP PARAM F/U: HCPCS | Performed by: NURSE PRACTITIONER

## 2025-04-03 PROCEDURE — 1036F TOBACCO NON-USER: CPT | Performed by: NURSE PRACTITIONER

## 2025-04-03 PROCEDURE — G8427 DOCREV CUR MEDS BY ELIG CLIN: HCPCS | Performed by: NURSE PRACTITIONER

## 2025-04-03 PROCEDURE — 87635 SARS-COV-2 COVID-19 AMP PRB: CPT | Performed by: NURSE PRACTITIONER

## 2025-04-03 PROCEDURE — 87502 INFLUENZA DNA AMP PROBE: CPT | Performed by: NURSE PRACTITIONER

## 2025-04-03 PROCEDURE — 99213 OFFICE O/P EST LOW 20 MIN: CPT | Performed by: NURSE PRACTITIONER

## 2025-04-03 RX ORDER — CODEINE PHOSPHATE AND GUAIFENESIN 10; 100 MG/5ML; MG/5ML
5 SOLUTION ORAL 3 TIMES DAILY PRN
Qty: 45 ML | Refills: 0 | Status: SHIPPED | OUTPATIENT
Start: 2025-04-03 | End: 2025-04-06

## 2025-04-03 RX ORDER — AZITHROMYCIN 250 MG/1
TABLET, FILM COATED ORAL
Qty: 6 TABLET | Refills: 0 | Status: CANCELLED | OUTPATIENT
Start: 2025-04-03 | End: 2025-04-13

## 2025-04-03 RX ORDER — ALBUTEROL SULFATE 90 UG/1
2 INHALANT RESPIRATORY (INHALATION) 4 TIMES DAILY PRN
Qty: 18 G | Refills: 0 | Status: SHIPPED | OUTPATIENT
Start: 2025-04-03

## 2025-04-03 RX ORDER — DOXYCYCLINE 100 MG/1
100 CAPSULE ORAL 2 TIMES DAILY
Qty: 14 CAPSULE | Refills: 0 | Status: SHIPPED | OUTPATIENT
Start: 2025-04-03 | End: 2025-04-10

## 2025-04-03 NOTE — PROGRESS NOTES
Chief Complaint   Patient presents with    Other     Started Saturday with cough, green mucus, temp was 101.0 last PM, achy       \"Have you been to the ER, urgent care clinic since your last visit?  Hospitalized since your last visit?\"    NO    “Have you seen or consulted any other health care providers outside our system since your last visit?”    NO     “Have you had a pap smear?”    NO    Date of last Cervical Cancer screen (HPV or PAP): 6/7/2012

## 2025-04-03 NOTE — PROGRESS NOTES
Lay Fish (: 1985) is a 40 y.o. female patient, here for evaluation of the following chief complaint(s):  Other (Started Saturday with cough, green mucus, temp was 101.0 last PM, achy)       ASSESSMENT/PLAN:  Assessment & Plan  Acute bronchitis, unspecified organism  Cough medicine as directed patient is aware codeine can cause drowsiness do not drink or drive  Doxycycline as prescribed  Albuterol inhaler as directed  ER for severe symptoms return to our office for any new worsening or continued symptom  Orders:    guaiFENesin-codeine (GUAIFENESIN AC) 100-10 MG/5ML liquid; Take 5 mLs by mouth 3 times daily as needed for Cough for up to 3 days. Max Daily Amount: 15 mLs    doxycycline hyclate (VIBRAMYCIN) 100 MG capsule; Take 1 capsule by mouth 2 times daily for 7 days    albuterol sulfate HFA (VENTOLIN HFA) 108 (90 Base) MCG/ACT inhaler; Inhale 2 puffs into the lungs 4 times daily as needed for Wheezing                 No follow-ups on file.      SUBJECTIVE/OBJECTIVE:      40-year-old female presents to the office for acute illness  Started Saturday, 5 days, cough fever productive cough.  Patient's younger daughter has been diagnosed with pneumonia on antibiotics  Patient states were symptom is her cough      /78 (BP Site: Right Upper Arm, Patient Position: Sitting)   Pulse (!) 102   Temp 98.3 °F (36.8 °C) (Oral)   Resp 18   Ht 1.676 m (5' 6\")   Wt 83.5 kg (184 lb)   SpO2 99%   BMI 29.70 kg/m²        Physical Exam   General:  alert, cooperative, well appearing, in no apparent distress.  Eyes:  Pupils are equally round and reactive to light with accommodation.  The extra-ocular movements are intact.  The lids are without swelling, lesions, or drainage.  The conjunctiva is clear and noninjected.  ENT:    The tongue and mucous membranes are pink and moist without lesions.  The pharynx is non-erythematous without exudates.  CV:  The heart sounds are regular in rate and rhythm.  There is a normal

## 2025-04-25 DIAGNOSIS — I10 PRIMARY HYPERTENSION: ICD-10-CM

## 2025-04-25 RX ORDER — LOSARTAN POTASSIUM 25 MG/1
25 TABLET ORAL DAILY
Qty: 30 TABLET | Refills: 0 | Status: SHIPPED | OUTPATIENT
Start: 2025-04-25

## 2025-04-25 NOTE — TELEPHONE ENCOUNTER
Last follow up appointment for chronic conditions was 12/23. Numerous acute appointments. No labs since 11/23. Will refill x 30 days and have patient schedule appointment.

## 2025-04-25 NOTE — TELEPHONE ENCOUNTER
Patient needs appointment for follow up chronic conditions and labs. Medication refilled x 30 days. Thank you.

## 2025-04-29 ENCOUNTER — TELEMEDICINE (OUTPATIENT)
Facility: CLINIC | Age: 40
End: 2025-04-29
Payer: COMMERCIAL

## 2025-04-29 ENCOUNTER — RESULTS FOLLOW-UP (OUTPATIENT)
Facility: CLINIC | Age: 40
End: 2025-04-29

## 2025-04-29 DIAGNOSIS — I10 PRIMARY HYPERTENSION: ICD-10-CM

## 2025-04-29 DIAGNOSIS — J06.9 VIRAL URI: ICD-10-CM

## 2025-04-29 DIAGNOSIS — J02.9 SORE THROAT: ICD-10-CM

## 2025-04-29 DIAGNOSIS — U07.1 COVID: Primary | ICD-10-CM

## 2025-04-29 LAB
STREP PYOGENES DNA, POC: NEGATIVE
VALID INTERNAL CONTROL, POC: YES

## 2025-04-29 PROCEDURE — G8417 CALC BMI ABV UP PARAM F/U: HCPCS | Performed by: NURSE PRACTITIONER

## 2025-04-29 PROCEDURE — 87651 STREP A DNA AMP PROBE: CPT | Performed by: NURSE PRACTITIONER

## 2025-04-29 PROCEDURE — 99213 OFFICE O/P EST LOW 20 MIN: CPT | Performed by: NURSE PRACTITIONER

## 2025-04-29 PROCEDURE — 1036F TOBACCO NON-USER: CPT | Performed by: NURSE PRACTITIONER

## 2025-04-29 PROCEDURE — G8427 DOCREV CUR MEDS BY ELIG CLIN: HCPCS | Performed by: NURSE PRACTITIONER

## 2025-04-29 ASSESSMENT — PATIENT HEALTH QUESTIONNAIRE - PHQ9
1. LITTLE INTEREST OR PLEASURE IN DOING THINGS: NOT AT ALL
SUM OF ALL RESPONSES TO PHQ QUESTIONS 1-9: 0
2. FEELING DOWN, DEPRESSED OR HOPELESS: NOT AT ALL
SUM OF ALL RESPONSES TO PHQ QUESTIONS 1-9: 0

## 2025-04-29 NOTE — PROGRESS NOTES
Chief Complaint   Patient presents with    Other     Patient was positive last night, Heart palpitations Saturday, sore throat started on Sunday, general fatique       \"Have you been to the ER, urgent care clinic since your last visit?  Hospitalized since your last visit?\"    NO    “Have you seen or consulted any other health care providers outside our system since your last visit?”    NO     “Have you had a pap smear?”    NO    Date of last Cervical Cancer screen (HPV or PAP): 6/7/2012             
symptoms started  She did get heart palpitations but patient states incidentally she feels like she is on a really high dose of levothyroxine and this might be part of that scenario  Patient's worst symptom today is a sore throat  She does have a cough that started yesterday but sore throat is the worst symptom  No reported fevers  Patient states she tested positive for COVID last night      Patient's thyroid is managed by endocrinology  To Sayed                      Objective   Patient-Reported Vitals  No data recorded     Physical Exam  [INSTRUCTIONS:  \"[x]\" Indicates a positive item  \"[]\" Indicates a negative item  -- DELETE ALL ITEMS NOT EXAMINED]    Constitutional: [x] Appears well-developed and well-nourished [x] No apparent distress      [] Abnormal -     Mental status: [x] Alert and awake  [x] Oriented to person/place/time [x] Able to follow commands    [] Abnormal -     Eyes:   EOM    [x]  Normal    [] Abnormal -   Sclera  [x]  Normal    [] Abnormal -          Discharge [x]  None visible   [] Abnormal -       Pulmonary/Chest: [x] Respiratory effort normal   [x] No visualized signs of difficulty breathing or respiratory distress        [] Abnormal -          Neurological:        [x] No Facial Asymmetry (Cranial nerve 7 motor function) (limited exam due to video visit)          [x] No gaze palsy        [] Abnormal -          Skin:        [x] No significant exanthematous lesions or discoloration noted on facial skin         [] Abnormal -            Psychiatric:       [x] Normal Affect [] Abnormal -        [x] No Hallucinations             On this date 4/29/2025 I have spent 20 reviewing previous notes, test results, and other pertinent medical information with the patient, discussing the diagnosis and importance of compliance with the treatment plan as well as documenting on the day of the visit.    --Jordyn Brooks, JARETT

## 2025-05-09 ENCOUNTER — APPOINTMENT (OUTPATIENT)
Age: 40
End: 2025-05-09
Payer: COMMERCIAL

## 2025-05-09 ENCOUNTER — HOSPITAL ENCOUNTER (EMERGENCY)
Age: 40
Discharge: ANOTHER HOSPITAL | End: 2025-05-10
Attending: EMERGENCY MEDICINE
Payer: COMMERCIAL

## 2025-05-09 ENCOUNTER — OFFICE VISIT (OUTPATIENT)
Facility: CLINIC | Age: 40
End: 2025-05-09
Payer: COMMERCIAL

## 2025-05-09 VITALS
WEIGHT: 192 LBS | TEMPERATURE: 98.7 F | OXYGEN SATURATION: 98 % | RESPIRATION RATE: 18 BRPM | HEIGHT: 66 IN | HEART RATE: 102 BPM | BODY MASS INDEX: 30.86 KG/M2 | DIASTOLIC BLOOD PRESSURE: 77 MMHG | SYSTOLIC BLOOD PRESSURE: 123 MMHG

## 2025-05-09 DIAGNOSIS — D73.5 SPLENIC INFARCTION: Primary | ICD-10-CM

## 2025-05-09 DIAGNOSIS — D69.6 THROMBOCYTOPENIA: ICD-10-CM

## 2025-05-09 DIAGNOSIS — D72.829 LEUKOCYTOSIS, UNSPECIFIED TYPE: ICD-10-CM

## 2025-05-09 DIAGNOSIS — R50.9 FEVER, UNSPECIFIED FEVER CAUSE: ICD-10-CM

## 2025-05-09 DIAGNOSIS — R10.9 LEFT FLANK PAIN: Primary | ICD-10-CM

## 2025-05-09 DIAGNOSIS — D64.9 SEVERE ANEMIA: ICD-10-CM

## 2025-05-09 DIAGNOSIS — U09.9 POST-COVID SYNDROME: ICD-10-CM

## 2025-05-09 PROBLEM — I10 HIGH BLOOD PRESSURE: Status: ACTIVE | Noted: 2023-05-21

## 2025-05-09 PROBLEM — M48.061 SPINAL STENOSIS OF LUMBAR REGION WITHOUT NEUROGENIC CLAUDICATION: Status: ACTIVE | Noted: 2018-07-03

## 2025-05-09 PROBLEM — N80.30 ENDOMETRIOSIS OF PELVIC PERITONEUM: Status: ACTIVE | Noted: 2025-05-09

## 2025-05-09 PROBLEM — E89.0 POST-SURGICAL HYPOTHYROIDISM: Status: ACTIVE | Noted: 2017-07-19

## 2025-05-09 LAB
ALBUMIN SERPL-MCNC: 3.2 G/DL (ref 3.4–5)
ALBUMIN SERPL-MCNC: 3.2 G/DL (ref 3.4–5)
ALBUMIN/GLOB SERPL: 0.7 (ref 0.8–1.7)
ALBUMIN/GLOB SERPL: 0.7 (ref 0.8–1.7)
ALP SERPL-CCNC: 116 U/L (ref 45–117)
ALP SERPL-CCNC: 117 U/L (ref 45–117)
ALT SERPL-CCNC: 47 U/L (ref 13–56)
ALT SERPL-CCNC: 47 U/L (ref 13–56)
ANION GAP SERPL CALC-SCNC: 6 MMOL/L (ref 3–18)
ANION GAP SERPL CALC-SCNC: 8 MMOL/L (ref 3–18)
AST SERPL W P-5'-P-CCNC: 86 U/L (ref 10–38)
AST SERPL W P-5'-P-CCNC: 87 U/L (ref 10–38)
BASOPHILS # BLD: 0 K/UL (ref 0–0.1)
BASOPHILS # BLD: 0 K/UL (ref 0–0.1)
BASOPHILS NFR BLD: 0 % (ref 0–2)
BASOPHILS NFR BLD: 0 % (ref 0–2)
BILIRUB SERPL-MCNC: 0.4 MG/DL (ref 0.2–1)
BILIRUB SERPL-MCNC: 0.4 MG/DL (ref 0.2–1)
BLASTS NFR BLD MANUAL: 70 %
BLASTS NFR BLD MANUAL: 70 %
BUN SERPL-MCNC: 8 MG/DL (ref 7–18)
BUN SERPL-MCNC: 8 MG/DL (ref 7–18)
BUN/CREAT SERPL: 10 (ref 12–20)
BUN/CREAT SERPL: 11 (ref 12–20)
CA-I BLD-MCNC: 9.3 MG/DL (ref 8.5–10.1)
CA-I BLD-MCNC: 9.5 MG/DL (ref 8.5–10.1)
CHLORIDE SERPL-SCNC: 99 MMOL/L (ref 100–111)
CHLORIDE SERPL-SCNC: 99 MMOL/L (ref 100–111)
CO2 SERPL-SCNC: 33 MMOL/L (ref 21–32)
CO2 SERPL-SCNC: 34 MMOL/L (ref 21–32)
CREAT SERPL-MCNC: 0.76 MG/DL (ref 0.6–1.3)
CREAT SERPL-MCNC: 0.81 MG/DL (ref 0.6–1.3)
DIFFERENTIAL METHOD BLD: ABNORMAL
DIFFERENTIAL METHOD BLD: ABNORMAL
EKG ATRIAL RATE: 121 BPM
EKG DIAGNOSIS: NORMAL
EKG P AXIS: 49 DEGREES
EKG P-R INTERVAL: 134 MS
EKG Q-T INTERVAL: 320 MS
EKG QRS DURATION: 72 MS
EKG QTC CALCULATION (BAZETT): 454 MS
EKG R AXIS: 48 DEGREES
EKG T AXIS: 35 DEGREES
EKG VENTRICULAR RATE: 121 BPM
EOSINOPHIL # BLD: 0 K/UL (ref 0–0.4)
EOSINOPHIL # BLD: 0 K/UL (ref 0–0.4)
EOSINOPHIL NFR BLD: 0 % (ref 0–5)
EOSINOPHIL NFR BLD: 0 % (ref 0–5)
ERYTHROCYTE [DISTWIDTH] IN BLOOD BY AUTOMATED COUNT: 18.8 % (ref 11.6–14.5)
ERYTHROCYTE [DISTWIDTH] IN BLOOD BY AUTOMATED COUNT: 19 % (ref 11.6–14.5)
FLUAV RNA SPEC QL NAA+PROBE: NOT DETECTED
FLUBV RNA SPEC QL NAA+PROBE: NOT DETECTED
GLOBULIN SER CALC-MCNC: 4.3 G/DL (ref 2–4)
GLOBULIN SER CALC-MCNC: 4.3 G/DL (ref 2–4)
GLUCOSE SERPL-MCNC: 152 MG/DL (ref 74–99)
GLUCOSE SERPL-MCNC: 165 MG/DL (ref 74–99)
HCT VFR BLD AUTO: 19.2 % (ref 35–45)
HCT VFR BLD AUTO: 19.4 % (ref 35–45)
HGB BLD-MCNC: 6.4 G/DL (ref 12–16)
HGB BLD-MCNC: 6.4 G/DL (ref 12–16)
IMM GRANULOCYTES # BLD AUTO: 0 K/UL
IMM GRANULOCYTES # BLD AUTO: 0 K/UL
IMM GRANULOCYTES NFR BLD AUTO: 0 %
IMM GRANULOCYTES NFR BLD AUTO: 0 %
LYMPHOCYTES # BLD: 34.58 K/UL (ref 0.9–3.6)
LYMPHOCYTES # BLD: 39.63 K/UL (ref 0.9–3.6)
LYMPHOCYTES NFR BLD: 15 % (ref 21–52)
LYMPHOCYTES NFR BLD: 15 % (ref 21–52)
MAGNESIUM SERPL-MCNC: 1.8 MG/DL (ref 1.6–2.6)
MAGNESIUM SERPL-MCNC: 1.9 MG/DL (ref 1.6–2.6)
MCH RBC QN AUTO: 35.2 PG (ref 24–34)
MCH RBC QN AUTO: 35.6 PG (ref 24–34)
MCHC RBC AUTO-ENTMCNC: 33 G/DL (ref 31–37)
MCHC RBC AUTO-ENTMCNC: 33.3 G/DL (ref 31–37)
MCV RBC AUTO: 106.6 FL (ref 78–100)
MCV RBC AUTO: 106.7 FL (ref 78–100)
MONOCYTES # BLD: 13.83 K/UL (ref 0.05–1.2)
MONOCYTES # BLD: 15.85 K/UL (ref 0.05–1.2)
MONOCYTES NFR BLD: 6 % (ref 3–10)
MONOCYTES NFR BLD: 6 % (ref 3–10)
NEUTS SEG # BLD: 20.75 K/UL (ref 1.8–8)
NEUTS SEG # BLD: 23.78 K/UL (ref 1.8–8)
NEUTS SEG NFR BLD: 9 % (ref 40–73)
NEUTS SEG NFR BLD: 9 % (ref 40–73)
NRBC # BLD: 0.05 K/UL (ref 0–0.01)
NRBC # BLD: 0.05 K/UL (ref 0–0.01)
NRBC BLD-RTO: 0 PER 100 WBC
NRBC BLD-RTO: 0 PER 100 WBC
PLATELET # BLD AUTO: 47 K/UL (ref 135–420)
PLATELET # BLD AUTO: 51 K/UL (ref 135–420)
PLATELET COMMENT: ABNORMAL
PMV BLD AUTO: 10.7 FL (ref 9.2–11.8)
PMV BLD AUTO: 9.9 FL (ref 9.2–11.8)
POTASSIUM SERPL-SCNC: 2.5 MMOL/L (ref 3.5–5.5)
POTASSIUM SERPL-SCNC: 2.7 MMOL/L (ref 3.5–5.5)
PROT SERPL-MCNC: 7.5 G/DL (ref 6.4–8.2)
PROT SERPL-MCNC: 7.5 G/DL (ref 6.4–8.2)
RBC # BLD AUTO: 1.8 M/UL (ref 4.2–5.3)
RBC # BLD AUTO: 1.82 M/UL (ref 4.2–5.3)
RBC MORPH BLD: ABNORMAL
SARS-COV-2 RNA RESP QL NAA+PROBE: NOT DETECTED
SODIUM SERPL-SCNC: 139 MMOL/L (ref 136–145)
SODIUM SERPL-SCNC: 140 MMOL/L (ref 136–145)
WBC # BLD AUTO: 230.5 K/UL (ref 4.6–13.2)
WBC # BLD AUTO: 264.2 K/UL (ref 4.6–13.2)

## 2025-05-09 PROCEDURE — 96365 THER/PROPH/DIAG IV INF INIT: CPT

## 2025-05-09 PROCEDURE — 85025 COMPLETE CBC W/AUTO DIFF WBC: CPT

## 2025-05-09 PROCEDURE — 6370000000 HC RX 637 (ALT 250 FOR IP): Performed by: EMERGENCY MEDICINE

## 2025-05-09 PROCEDURE — 99213 OFFICE O/P EST LOW 20 MIN: CPT | Performed by: NURSE PRACTITIONER

## 2025-05-09 PROCEDURE — 1036F TOBACCO NON-USER: CPT | Performed by: NURSE PRACTITIONER

## 2025-05-09 PROCEDURE — 71275 CT ANGIOGRAPHY CHEST: CPT

## 2025-05-09 PROCEDURE — 83735 ASSAY OF MAGNESIUM: CPT

## 2025-05-09 PROCEDURE — 86900 BLOOD TYPING SEROLOGIC ABO: CPT

## 2025-05-09 PROCEDURE — 2580000003 HC RX 258: Performed by: EMERGENCY MEDICINE

## 2025-05-09 PROCEDURE — 6370000000 HC RX 637 (ALT 250 FOR IP): Performed by: FAMILY MEDICINE

## 2025-05-09 PROCEDURE — 86850 RBC ANTIBODY SCREEN: CPT

## 2025-05-09 PROCEDURE — 71045 X-RAY EXAM CHEST 1 VIEW: CPT

## 2025-05-09 PROCEDURE — 96376 TX/PRO/DX INJ SAME DRUG ADON: CPT

## 2025-05-09 PROCEDURE — G8427 DOCREV CUR MEDS BY ELIG CLIN: HCPCS | Performed by: NURSE PRACTITIONER

## 2025-05-09 PROCEDURE — G8417 CALC BMI ABV UP PARAM F/U: HCPCS | Performed by: NURSE PRACTITIONER

## 2025-05-09 PROCEDURE — 86901 BLOOD TYPING SEROLOGIC RH(D): CPT

## 2025-05-09 PROCEDURE — 6360000004 HC RX CONTRAST MEDICATION: Performed by: EMERGENCY MEDICINE

## 2025-05-09 PROCEDURE — 93005 ELECTROCARDIOGRAM TRACING: CPT | Performed by: EMERGENCY MEDICINE

## 2025-05-09 PROCEDURE — P9016 RBC LEUKOCYTES REDUCED: HCPCS

## 2025-05-09 PROCEDURE — 36430 TRANSFUSION BLD/BLD COMPNT: CPT

## 2025-05-09 PROCEDURE — 6360000002 HC RX W HCPCS: Performed by: FAMILY MEDICINE

## 2025-05-09 PROCEDURE — 96366 THER/PROPH/DIAG IV INF ADDON: CPT

## 2025-05-09 PROCEDURE — 6360000002 HC RX W HCPCS: Performed by: EMERGENCY MEDICINE

## 2025-05-09 PROCEDURE — 99285 EMERGENCY DEPT VISIT HI MDM: CPT

## 2025-05-09 PROCEDURE — 80053 COMPREHEN METABOLIC PANEL: CPT

## 2025-05-09 PROCEDURE — 74177 CT ABD & PELVIS W/CONTRAST: CPT

## 2025-05-09 PROCEDURE — 87636 SARSCOV2 & INF A&B AMP PRB: CPT

## 2025-05-09 PROCEDURE — 96375 TX/PRO/DX INJ NEW DRUG ADDON: CPT

## 2025-05-09 RX ORDER — ONDANSETRON 2 MG/ML
4 INJECTION INTRAMUSCULAR; INTRAVENOUS
Status: COMPLETED | OUTPATIENT
Start: 2025-05-09 | End: 2025-05-09

## 2025-05-09 RX ORDER — MORPHINE SULFATE 4 MG/ML
4 INJECTION, SOLUTION INTRAMUSCULAR; INTRAVENOUS
Status: COMPLETED | OUTPATIENT
Start: 2025-05-09 | End: 2025-05-09

## 2025-05-09 RX ORDER — 0.9 % SODIUM CHLORIDE 0.9 %
1500 INTRAVENOUS SOLUTION INTRAVENOUS ONCE
Status: COMPLETED | OUTPATIENT
Start: 2025-05-09 | End: 2025-05-09

## 2025-05-09 RX ORDER — IOPAMIDOL 755 MG/ML
100 INJECTION, SOLUTION INTRAVASCULAR
Status: COMPLETED | OUTPATIENT
Start: 2025-05-09 | End: 2025-05-09

## 2025-05-09 RX ORDER — ONDANSETRON 2 MG/ML
4 INJECTION INTRAMUSCULAR; INTRAVENOUS
Status: DISCONTINUED | OUTPATIENT
Start: 2025-05-09 | End: 2025-05-10 | Stop reason: HOSPADM

## 2025-05-09 RX ORDER — PSEUDOEPHED/ACETAMINOPH/DIPHEN 30MG-500MG
TABLET ORAL
COMMUNITY
Start: 2025-05-05

## 2025-05-09 RX ORDER — POTASSIUM CHLORIDE 750 MG/1
40 TABLET, EXTENDED RELEASE ORAL ONCE
Status: COMPLETED | OUTPATIENT
Start: 2025-05-09 | End: 2025-05-09

## 2025-05-09 RX ORDER — POTASSIUM CHLORIDE 7.45 MG/ML
10 INJECTION INTRAVENOUS
Status: COMPLETED | OUTPATIENT
Start: 2025-05-09 | End: 2025-05-09

## 2025-05-09 RX ORDER — SODIUM CHLORIDE 9 MG/ML
INJECTION, SOLUTION INTRAVENOUS CONTINUOUS
Status: DISCONTINUED | OUTPATIENT
Start: 2025-05-09 | End: 2025-05-10 | Stop reason: HOSPADM

## 2025-05-09 RX ORDER — MORPHINE SULFATE 4 MG/ML
4 INJECTION, SOLUTION INTRAMUSCULAR; INTRAVENOUS
Refills: 0 | Status: COMPLETED | OUTPATIENT
Start: 2025-05-09 | End: 2025-05-09

## 2025-05-09 RX ORDER — SODIUM CHLORIDE 9 MG/ML
INJECTION, SOLUTION INTRAVENOUS PRN
Status: DISCONTINUED | OUTPATIENT
Start: 2025-05-09 | End: 2025-05-10 | Stop reason: HOSPADM

## 2025-05-09 RX ORDER — ACETAMINOPHEN 500 MG
1000 TABLET ORAL
Status: COMPLETED | OUTPATIENT
Start: 2025-05-09 | End: 2025-05-09

## 2025-05-09 RX ADMIN — SODIUM CHLORIDE: 0.9 INJECTION, SOLUTION INTRAVENOUS at 18:39

## 2025-05-09 RX ADMIN — POTASSIUM CHLORIDE 10 MEQ: 7.46 INJECTION, SOLUTION INTRAVENOUS at 18:24

## 2025-05-09 RX ADMIN — MORPHINE SULFATE 4 MG: 4 INJECTION, SOLUTION INTRAMUSCULAR; INTRAVENOUS at 22:26

## 2025-05-09 RX ADMIN — POTASSIUM CHLORIDE 10 MEQ: 7.46 INJECTION, SOLUTION INTRAVENOUS at 17:20

## 2025-05-09 RX ADMIN — IOPAMIDOL 100 ML: 755 INJECTION, SOLUTION INTRAVENOUS at 15:55

## 2025-05-09 RX ADMIN — POTASSIUM CHLORIDE 10 MEQ: 7.46 INJECTION, SOLUTION INTRAVENOUS at 19:42

## 2025-05-09 RX ADMIN — POTASSIUM CHLORIDE 40 MEQ: 750 TABLET, EXTENDED RELEASE ORAL at 16:04

## 2025-05-09 RX ADMIN — ONDANSETRON 4 MG: 2 INJECTION, SOLUTION INTRAMUSCULAR; INTRAVENOUS at 15:59

## 2025-05-09 RX ADMIN — POTASSIUM CHLORIDE 10 MEQ: 7.46 INJECTION, SOLUTION INTRAVENOUS at 16:04

## 2025-05-09 RX ADMIN — SODIUM CHLORIDE 1000 ML: 0.9 INJECTION, SOLUTION INTRAVENOUS at 15:57

## 2025-05-09 RX ADMIN — MORPHINE SULFATE 4 MG: 4 INJECTION, SOLUTION INTRAMUSCULAR; INTRAVENOUS at 16:33

## 2025-05-09 RX ADMIN — ACETAMINOPHEN 1000 MG: 500 TABLET ORAL at 22:13

## 2025-05-09 RX ADMIN — MORPHINE SULFATE 4 MG: 4 INJECTION, SOLUTION INTRAMUSCULAR; INTRAVENOUS at 19:39

## 2025-05-09 ASSESSMENT — PAIN DESCRIPTION - ORIENTATION
ORIENTATION: RIGHT;UPPER
ORIENTATION: LEFT
ORIENTATION: LEFT;LOWER
ORIENTATION: LEFT;UPPER
ORIENTATION: LEFT;UPPER
ORIENTATION: LEFT
ORIENTATION: RIGHT;UPPER

## 2025-05-09 ASSESSMENT — LIFESTYLE VARIABLES
HOW OFTEN DO YOU HAVE A DRINK CONTAINING ALCOHOL: NEVER
HOW MANY STANDARD DRINKS CONTAINING ALCOHOL DO YOU HAVE ON A TYPICAL DAY: PATIENT DOES NOT DRINK

## 2025-05-09 ASSESSMENT — PAIN DESCRIPTION - DESCRIPTORS
DESCRIPTORS: SHARP

## 2025-05-09 ASSESSMENT — PAIN DESCRIPTION - LOCATION
LOCATION: ABDOMEN
LOCATION: ABDOMEN
LOCATION: RIB CAGE
LOCATION: RIB CAGE
LOCATION: ABDOMEN
LOCATION: RIB CAGE
LOCATION: ABDOMEN

## 2025-05-09 ASSESSMENT — PAIN - FUNCTIONAL ASSESSMENT
PAIN_FUNCTIONAL_ASSESSMENT: 0-10
PAIN_FUNCTIONAL_ASSESSMENT: 0-10

## 2025-05-09 ASSESSMENT — PATIENT HEALTH QUESTIONNAIRE - PHQ9
SUM OF ALL RESPONSES TO PHQ QUESTIONS 1-9: 0
1. LITTLE INTEREST OR PLEASURE IN DOING THINGS: NOT AT ALL
SUM OF ALL RESPONSES TO PHQ QUESTIONS 1-9: 0
2. FEELING DOWN, DEPRESSED OR HOPELESS: NOT AT ALL

## 2025-05-09 ASSESSMENT — PAIN SCALES - GENERAL
PAINLEVEL_OUTOF10: 5
PAINLEVEL_OUTOF10: 9
PAINLEVEL_OUTOF10: 7
PAINLEVEL_OUTOF10: 6
PAINLEVEL_OUTOF10: 8
PAINLEVEL_OUTOF10: 8
PAINLEVEL_OUTOF10: 9

## 2025-05-09 NOTE — PROGRESS NOTES
Lay Fish (: 1985) is a 40 y.o. female patient, here for evaluation of the following chief complaint(s):  Other (Diagnosed with Covid 9 days ago, had diarrhea with the Covid, started yesterday LUQ pain, took laxative, then had diarrhea)       ASSESSMENT/PLAN:  Assessment & Plan  Left flank pain    Differential diagnosis pneumonia, pleurisy, pulmonary embolism    Tachycardia recent fevers very bad pain 8 out of 10 left flank area left lung  I recommend this patient go to the emergency room patient states she agrees and understands and will go to ER       Fever, unspecified fever cause                         No follow-ups on file.      SUBJECTIVE/OBJECTIVE:     40-year-old female presents because she does not feel well  Patient states that she had COVID still Feeling poorly has been to the urgent care twice  Patient states she just got off some steroids which did seem to help with throat pain and coughing  Patient states today her biggest concern is a pain that she states is up under her rib cage on the left side she has the pain is very intense, thinking she might have had gas up in that area she took a laxative but states only a small amount of stool is coming out no large stool past  She denies abdominal pain or nausea vomiting  Patient states she had a temperature yesterday of 101  Heart rate today in office is 115  Patient states she does not have any difficulty urinating or pain with urination  Patient states she \"does not feel well at all\" states she thought about going to the ER but just had not done it        /77 (BP Site: Left Upper Arm, Patient Position: Sitting)   Pulse (!) 102   Temp 98.7 °F (37.1 °C) (Oral)   Resp 18   Ht 1.676 m (5' 6\")   Wt 87.1 kg (192 lb)   SpO2 98%   BMI 30.99 kg/m²        Physical Exam   General:  alert, cooperative,   Eyes:  Pupils are equally round and reactive to light with accommodation.   Neck:  There is normal range of motion.  The thyroid is normal

## 2025-05-09 NOTE — ED TRIAGE NOTES
Patient states cough, shortness of breath began on 3/27/25, positive for COVID, also fever as high as 103, last fever last night (101), has been taking Tylenol and Motrin, reports some diarrhea. Saw Jordyn Brooks this am who sent her here. Patient took Paxlovid last week. C/o left rib cage pain 9/10.

## 2025-05-09 NOTE — PROGRESS NOTES
No chief complaint on file.      \"Have you been to the ER, urgent care clinic since your last visit?  Hospitalized since your last visit?\"    YES - When: approximately 4 days ago.  Where and Why: Urgent care for sore throat, fever with Covid, .    “Have you seen or consulted any other health care providers outside our system since your last visit?”    NO     “Have you had a pap smear?”    NO    Date of last Cervical Cancer screen (HPV or PAP): 6/7/2012              Recommended observation.

## 2025-05-09 NOTE — ED PROVIDER NOTES
20      Est, Glom Filt Rate >90 >60 ml/min/1.73m2    Calcium 9.3 8.5 - 10.1 mg/dL    Total Bilirubin 0.4 0.2 - 1.0 mg/dL    AST 87 (H) 10 - 38 U/L    ALT 47 13 - 56 U/L    Alk Phosphatase 117 45 - 117 U/L    Total Protein 7.5 6.4 - 8.2 g/dL    Albumin 3.2 (L) 3.4 - 5.0 g/dL    Globulin 4.3 (H) 2.0 - 4.0 g/dL    Albumin/Globulin Ratio 0.7 (L) 0.8 - 1.7     Magnesium    Collection Time: 05/09/25  1:13 PM   Result Value Ref Range    Magnesium 1.8 1.6 - 2.6 mg/dL   CBC with Auto Differential    Collection Time: 05/09/25  2:15 PM   Result Value Ref Range    .2 (HH) 4.6 - 13.2 K/uL    RBC 1.82 (L) 4.20 - 5.30 M/uL    Hemoglobin 6.4 (L) 12.0 - 16.0 g/dL    Hematocrit 19.4 (L) 35.0 - 45.0 %    .6 (H) 78.0 - 100.0 FL    MCH 35.2 (H) 24.0 - 34.0 PG    MCHC 33.0 31.0 - 37.0 g/dL    RDW 19.0 (H) 11.6 - 14.5 %    Platelets 51 (L) 135 - 420 K/uL    MPV 9.9 9.2 - 11.8 FL    Nucleated RBCs 0.0 0.0  WBC    nRBC 0.05 (H) 0.00 - 0.01 K/uL    Neutrophils % 9.0 (L) 40 - 73 %    Lymphocytes % 15.0 (L) 21 - 52 %    Monocytes % 6.0 3 - 10 %    Eosinophils % 0.0 0 - 5 %    Basophils % 0.0 0 - 2 %    Blasts 70 (H) 0 %    Immature Granulocytes % 0.0 %    Neutrophils Absolute 23.78 (H) 1.8 - 8.0 K/UL    Lymphocytes Absolute 39.63 (H) 0.9 - 3.6 K/UL    Monocytes Absolute 15.85 (H) 0.05 - 1.2 K/UL    Eosinophils Absolute 0.00 0.0 - 0.4 K/UL    Basophils Absolute 0.00 0.0 - 0.1 K/UL    Immature Granulocytes Absolute 0.00 K/UL    Differential Type Manual      Platelet Comment NO PLT CLUMPS, LOW PLT COUNT.     RBC Comment Anisocytosis  1+        RBC Comment Sending for PATH review.   Macrocytosis  1+       Magnesium    Collection Time: 05/09/25  2:15 PM   Result Value Ref Range    Magnesium 1.9 1.6 - 2.6 mg/dL   Comprehensive Metabolic Panel    Collection Time: 05/09/25  2:15 PM   Result Value Ref Range    Sodium 140 136 - 145 mmol/L    Potassium 2.7 (LL) 3.5 - 5.5 mmol/L    Chloride 99 (L) 100 - 111 mmol/L    CO2 33 (H) 21 -      Sexually Abused: No   Depression: Not at risk (5/9/2025)    PHQ-2     PHQ-2 Score: 0   Housing Stability: Low Risk  (3/7/2025)    Housing Stability Vital Sign     Unable to Pay for Housing in the Last Year: No     Number of Times Moved in the Last Year: 0     Homeless in the Last Year: No   Interpersonal Safety: Not At Risk (11/5/2023)    Humiliation, Afraid, Rape, and Kick questionnaire     Fear of Current or Ex-Partner: No     Emotionally Abused: No     Physically Abused: No     Sexually Abused: No   Utilities: Not At Risk (3/7/2025)    [facility] Utilities     Threatened with loss of utilities: No         Procedures:  Procedures    ED Course:   3:05 PM EDT: Initial assessment performed. The patients presenting problems have been discussed, and they are in agreement with the care plan formulated and outlined with them.  I have encouraged them to ask questions as they arise throughout their visit.    Is this patient to be included in the SEP-1 core measure? No Exclusion criteria - the patient is NOT to be included for SEP-1 Core Measure due to: May have criteria for sepsis, but does not meet criteria for severe sepsis or septic shock         Diagnosis and Disposition     Labs Reviewed   CBC WITH AUTO DIFFERENTIAL - Abnormal; Notable for the following components:       Result Value    .5 (*)     RBC 1.80 (*)     Hemoglobin 6.4 (*)     Hematocrit 19.2 (*)     .7 (*)     MCH 35.6 (*)     RDW 18.8 (*)     Platelets 47 (*)     nRBC 0.05 (*)     Neutrophils % 9.0 (*)     Lymphocytes % 15.0 (*)     Blasts 70 (*)     Neutrophils Absolute 20.75 (*)     Lymphocytes Absolute 34.58 (*)     Monocytes Absolute 13.83 (*)     All other components within normal limits   COMPREHENSIVE METABOLIC PANEL - Abnormal; Notable for the following components:    Potassium 2.5 (*)     Chloride 99 (*)     CO2 34 (*)     Glucose 165 (*)     BUN/Creatinine Ratio 10 (*)     AST 87 (*)     Albumin 3.2 (*)     Globulin 4.3 (*)

## 2025-05-10 VITALS
BODY MASS INDEX: 30.22 KG/M2 | WEIGHT: 188 LBS | SYSTOLIC BLOOD PRESSURE: 125 MMHG | RESPIRATION RATE: 15 BRPM | OXYGEN SATURATION: 91 % | DIASTOLIC BLOOD PRESSURE: 65 MMHG | HEIGHT: 66 IN | HEART RATE: 91 BPM | TEMPERATURE: 98.8 F

## 2025-05-10 LAB
ABO + RH BLD: NORMAL
BLD PROD TYP BPU: NORMAL
BLOOD BANK BLOOD PRODUCT EXPIRATION DATE: NORMAL
BLOOD BANK DISPENSE STATUS: NORMAL
BLOOD BANK ISBT PRODUCT BLOOD TYPE: 600
BLOOD BANK UNIT TYPE AND RH: NORMAL
BLOOD GROUP ANTIBODIES SERPL: NEGATIVE
BPU ID: NORMAL
CROSSMATCH RESULT: NORMAL
SPECIMEN EXP DATE BLD: NORMAL
TRANSFUSION STATUS PATIENT QL: NORMAL
UNIT DIVISION: 0
UNIT ISSUE DATE/TIME: NORMAL

## 2025-05-10 PROCEDURE — 6360000002 HC RX W HCPCS: Performed by: FAMILY MEDICINE

## 2025-05-10 PROCEDURE — 96376 TX/PRO/DX INJ SAME DRUG ADON: CPT

## 2025-05-10 RX ORDER — MORPHINE SULFATE 4 MG/ML
4 INJECTION, SOLUTION INTRAMUSCULAR; INTRAVENOUS
Refills: 0 | Status: COMPLETED | OUTPATIENT
Start: 2025-05-10 | End: 2025-05-10

## 2025-05-10 RX ADMIN — MORPHINE SULFATE 4 MG: 4 INJECTION, SOLUTION INTRAMUSCULAR; INTRAVENOUS at 01:54

## 2025-05-10 ASSESSMENT — PAIN - FUNCTIONAL ASSESSMENT: PAIN_FUNCTIONAL_ASSESSMENT: 0-10

## 2025-05-10 ASSESSMENT — PAIN DESCRIPTION - ORIENTATION
ORIENTATION: LEFT;UPPER
ORIENTATION: UPPER;LEFT

## 2025-05-10 ASSESSMENT — PAIN SCALES - GENERAL
PAINLEVEL_OUTOF10: 8
PAINLEVEL_OUTOF10: 6

## 2025-05-10 ASSESSMENT — PAIN DESCRIPTION - DESCRIPTORS
DESCRIPTORS: SHARP
DESCRIPTORS: SHARP

## 2025-05-10 ASSESSMENT — PAIN DESCRIPTION - LOCATION
LOCATION: ABDOMEN
LOCATION: ABDOMEN

## 2025-05-10 NOTE — ED NOTES
Received care of patient from previous shift, awaiting all test results and disposition. Family at bedside, continues to have some discomfort in left side

## 2025-05-10 NOTE — ED NOTES
Patient has been accepted at [facility] room 603 number for report [phone]. Awaiting transport information. Patient and family aware of information, aware of plan. Reports feels as if temperature is returning Dr Callahan aware- orders received.

## 2025-05-10 NOTE — CONSENT
Informed Consent for Blood Component Transfusion Note    I have discussed with the patient the rationale for blood component transfusion; its benefits in treating or preventing fatigue, organ damage, or death; and its risk which includes mild transfusion reactions, rare risk of blood borne infection, or more serious but rare reactions. I have discussed the alternatives to transfusion, including the risk and consequences of not receiving transfusion. The patient had an opportunity to ask questions and had agreed to proceed with transfusion of blood components.    Electronically signed by Dru Kirby MD on 5/9/25 at 8:33 PM EDT

## 2025-05-10 NOTE — ED NOTES
Matheus from transfer center called with updated ambulance ETA. Reports fast track is running behind and now has a  time of 0130.

## 2025-05-10 NOTE — ED NOTES
TRANSFER - OUT REPORT:    Verbal report given to Allyn Saavedra RN on Lay Fish  being transferred to [facility] room 603 for urgent transfer       Report consisted of patient's Situation, Background, Assessment and   Recommendations(SBAR).     Information from the following report(s) ED Encounter Summary, ED SBAR, MAR, Med Rec Status, and Cardiac Rhythm NSR-STACH  was reviewed with the receiving nurse.    [place] Assessment:    Presents to emergency department  because of falls (Syncope, seizure, or loss of consciousness): No  Age > 70: No  Altered Mental Status, Intoxication with alcohol or substance confusion (Disorientation, impaired judgment, poor safety awaremess, or inability to follow instructions): No  Impaired Mobility: Ambulates or transfers with assistive devices or assistance; Unable to ambulate or transer.: No  Nursing Judgement: No          Lines:   Peripheral IV 05/09/25 Left Antecubital (Active)   Site Assessment Clean, dry & intact 05/09/25 1313   Line Status Blood return noted;Specimen collected;Flushed;Normal saline locked 05/09/25 1313   Phlebitis Assessment No symptoms 05/09/25 1313   Infiltration Assessment 0 05/09/25 1313   Dressing Status New dressing applied 05/09/25 1313   Dressing Type Transparent 05/09/25 1313        Opportunity for questions and clarification was provided.      Patient transported with:  Monitor and O2 @ 2lpmPRBCs

## 2025-05-10 NOTE — ED NOTES
Pt is going to [address] room 603.  Report number is [phone].  Pickup eta is 0030 with fast track

## 2025-05-12 LAB — PERIPHERAL SMEAR, MD REVIEW: NORMAL

## 2025-06-12 ENCOUNTER — TELEPHONE (OUTPATIENT)
Facility: CLINIC | Age: 40
End: 2025-06-12

## 2025-06-12 NOTE — TELEPHONE ENCOUNTER
Care Transitions Initial Follow Up Call    Outreach made within 2 business days of discharge: Yes    Patient: Lay Fish Patient : 1985   MRN: [id number]  Reason for Admission: Leukocytosis, anemia, concern for Leukemia  Discharge Date: 5/10/25      Spoke with: patient    Discharge department/facility: [facility]    TCM Interactive Patient Contact:  Was patient able to fill all prescriptions: Yes  Was patient instructed to bring all medications to the follow-up visit: Yes  Is patient taking all medications as directed in the discharge summary? Yes  Does patient understand their discharge instructions: Yes  Does patient have questions or concerns that need addressed prior to 7-14 day follow up office visit: no    Additional needs identified to be addressed with provider  No needs identified             Scheduled appointment with PCP within 7-14 days    Follow Up  Future Appointments   Date Time Provider Department Center   2025  3:00 PM [facility]       Vanessa Bridges LPN

## 2025-06-20 ENCOUNTER — OFFICE VISIT (OUTPATIENT)
Facility: CLINIC | Age: 40
End: 2025-06-20

## 2025-06-20 VITALS
WEIGHT: 172 LBS | RESPIRATION RATE: 18 BRPM | BODY MASS INDEX: 27.64 KG/M2 | HEIGHT: 66 IN | TEMPERATURE: 97 F | OXYGEN SATURATION: 98 % | DIASTOLIC BLOOD PRESSURE: 80 MMHG | SYSTOLIC BLOOD PRESSURE: 119 MMHG | HEART RATE: 86 BPM

## 2025-06-20 DIAGNOSIS — C92.00 ACUTE MYELOID LEUKEMIA NOT HAVING ACHIEVED REMISSION (HCC): Primary | ICD-10-CM

## 2025-06-20 DIAGNOSIS — I10 PRIMARY HYPERTENSION: ICD-10-CM

## 2025-06-20 RX ORDER — HYDROCODONE BITARTRATE AND ACETAMINOPHEN 5; 325 MG/1; MG/1
1 TABLET ORAL EVERY 6 HOURS PRN
COMMUNITY

## 2025-06-20 RX ORDER — MULTIVITAMIN,THERAPEUTIC
1 TABLET ORAL DAILY
COMMUNITY
Start: 2025-06-11

## 2025-06-20 RX ORDER — QUIZARTINIB 17.7 MG/1
TABLET, FILM COATED ORAL
COMMUNITY
Start: 2025-06-12

## 2025-06-20 RX ORDER — NYSTATIN 100000 [USP'U]/ML
SUSPENSION ORAL
COMMUNITY
Start: 2025-06-13

## 2025-06-20 RX ORDER — ACETAMINOPHEN AND CODEINE PHOSPHATE 120; 12 MG/5ML; MG/5ML
0.35 SOLUTION ORAL DAILY
COMMUNITY
Start: 2025-05-11

## 2025-06-20 RX ORDER — DIAZEPAM 5 MG/1
5 TABLET ORAL 2 TIMES DAILY
COMMUNITY
Start: 2025-06-13

## 2025-06-20 RX ORDER — ACYCLOVIR 200 MG/1
400 CAPSULE ORAL 2 TIMES DAILY
COMMUNITY
Start: 2025-06-10 | End: 2025-07-27

## 2025-06-20 NOTE — PROGRESS NOTES
Lay Fish (: 1985) is a 40 y.o. female patient, here for evaluation of the following chief complaint(s):  Follow-up (Discharged from [facility] on 6-10-25, Leukocytosis, anemia, concern for Leukemia)       ASSESSMENT/PLAN:  Assessment & Plan  Acute myeloid leukemia not having achieved remission (HCC)  Patient is being followed closely by hematology oncology  Has [facility] appointment for further discussion and planning of bone marrow transplant           Primary hypertension  Continue without medication patient will continue to monitor if any concerns should report to our office for further discussion and may            Patient should return to primary care office for acute concerns or needs as indicated continue to follow with [place] hematology oncology doctors directions and plan of care          No follow-ups on file.      SUBJECTIVE/OBJECTIVE:    40-year-old female for hospital admission discharge  Patient was admitted to the hospital on May 10 and discharged Liseth 10, 2025  Discharge diagnoses AML splenic infarction  Pancytopenia  Neutropenia  Anemia    Patient is being followed very closely by [place] hematology oncology Dr. Muniz  Patient has an appointment on  with [facility] Dr. Garcia, to discuss management and further progression for bone marrow transplant  Patient states she will do chemotherapy 3 days intensely and then have several weeks off  Patient states she has blood work that she has got scheduled to get done for hematology oncology  Patient will have her chemotherapy in [facility]    Patient has been holding losartan as instructed from discharge from the hospital blood pressure today stable and she has been checking it stating she is having normal blood pressures less than 140 less than        The patient is frail in appearance mildly pale vital signs are stable  Patient states she does have good family support spousal support but also sister is trying to been

## 2025-06-20 NOTE — PROGRESS NOTES
No chief complaint on file.      \"Have you been to the ER, urgent care clinic since your last visit?  Hospitalized since your last visit?\"    YES - When: approximately 2 months ago.  Where and Why: Flank pain.    “Have you seen or consulted any other health care providers outside our system since your last visit?”    YES - When: approximately 2  weeks ago.  Where and Why: Oncology.     “Have you had a pap smear?”    NO    Date of last Cervical Cancer screen (HPV or PAP): 6/7/2012

## 2025-06-20 NOTE — ASSESSMENT & PLAN NOTE
Continue without medication patient will continue to monitor if any concerns should report to our office for further discussion and may

## 2025-07-25 ENCOUNTER — OFFICE VISIT (OUTPATIENT)
Facility: CLINIC | Age: 40
End: 2025-07-25
Payer: COMMERCIAL

## 2025-07-25 VITALS
TEMPERATURE: 98.8 F | RESPIRATION RATE: 18 BRPM | HEIGHT: 66 IN | BODY MASS INDEX: 28.03 KG/M2 | OXYGEN SATURATION: 97 % | WEIGHT: 174.4 LBS | HEART RATE: 89 BPM | SYSTOLIC BLOOD PRESSURE: 99 MMHG | DIASTOLIC BLOOD PRESSURE: 62 MMHG

## 2025-07-25 DIAGNOSIS — C92.00 ACUTE MYELOID LEUKEMIA NOT HAVING ACHIEVED REMISSION (HCC): Primary | ICD-10-CM

## 2025-07-25 DIAGNOSIS — Z86.19 HISTORY OF SEPSIS: ICD-10-CM

## 2025-07-25 PROCEDURE — G8417 CALC BMI ABV UP PARAM F/U: HCPCS | Performed by: NURSE PRACTITIONER

## 2025-07-25 PROCEDURE — G8427 DOCREV CUR MEDS BY ELIG CLIN: HCPCS | Performed by: NURSE PRACTITIONER

## 2025-07-25 PROCEDURE — 1036F TOBACCO NON-USER: CPT | Performed by: NURSE PRACTITIONER

## 2025-07-25 PROCEDURE — 3078F DIAST BP <80 MM HG: CPT | Performed by: NURSE PRACTITIONER

## 2025-07-25 PROCEDURE — 3074F SYST BP LT 130 MM HG: CPT | Performed by: NURSE PRACTITIONER

## 2025-07-25 PROCEDURE — 99213 OFFICE O/P EST LOW 20 MIN: CPT | Performed by: NURSE PRACTITIONER

## 2025-07-25 RX ORDER — LANOLIN ALCOHOL/MO/W.PET/CERES
400 CREAM (GRAM) TOPICAL DAILY
COMMUNITY
Start: 2025-07-16

## 2025-07-25 RX ORDER — LEVOFLOXACIN 500 MG/1
500 TABLET, FILM COATED ORAL DAILY
COMMUNITY
Start: 2025-07-22

## 2025-07-25 RX ORDER — POSACONAZOLE 100 MG/1
300 TABLET, DELAYED RELEASE ORAL DAILY
COMMUNITY
Start: 2025-06-30

## 2025-07-25 RX ORDER — POTASSIUM CHLORIDE 750 MG/1
10 TABLET, EXTENDED RELEASE ORAL DAILY
COMMUNITY

## 2025-07-25 ASSESSMENT — PATIENT HEALTH QUESTIONNAIRE - PHQ9
SUM OF ALL RESPONSES TO PHQ QUESTIONS 1-9: 0
SUM OF ALL RESPONSES TO PHQ QUESTIONS 1-9: 0
1. LITTLE INTEREST OR PLEASURE IN DOING THINGS: NOT AT ALL
SUM OF ALL RESPONSES TO PHQ QUESTIONS 1-9: 0
2. FEELING DOWN, DEPRESSED OR HOPELESS: NOT AT ALL
SUM OF ALL RESPONSES TO PHQ QUESTIONS 1-9: 0

## 2025-07-25 NOTE — PROGRESS NOTES
Lay Fish (: 1985) is a 40 y.o. female patient, here for evaluation of the following chief complaint(s):  Follow-Up from Hospital       ASSESSMENT/PLAN:  Assessment & Plan  Acute myeloid leukemia not having achieved remission (HCC)  Dr. Muniz  Hematology oncology managing  Upcoming appointments  Upcoming bone marrow biopsy transplant pending         History of sepsis  Symptoms have improved and resolved              Follow-up in our office as indicated or concerns otherwise follow-up with all specialty management for further management of AML        No follow-ups on file.      SUBJECTIVE/OBJECTIVE:  40-year-old female for hospital follow  Patient was admitted on July 10 and discharged   Diagnosis is severe sepsis secondary to Klebsiella bacteremia  Legionella antigen positive urine  AML  Surveillance blood cultures 1 week after antibiotic, was recommended the patient states this will be completed by Dr. Munzi oncology, and that she is still on antibiotics and will be on them for several more weeks per Dr. Muniz    Patient has an appointment next week with Dr. Muniz on Wednesday,  Patient states she is not taking pain  Patient states she is feeling much better than when she was hospitalized at her baseline although using precautions to not get sick  Patient states she has no date yet for her bone marrow transplant but when she gets this completed she will be in [place] for about 6 months        BP 99/62   Pulse 89   Temp 98.8 °F (37.1 °C) (Temporal)   Resp 18   Ht 1.676 m (5' 6\")   Wt 79.1 kg (174 lb 6.4 oz)   SpO2 97%   BMI 28.15 kg/m²        Physical Exam   General:  alert, cooperative, well appearing, in no apparent distress.  CV:  The heart sounds are regular in rate and rhythm.  There is a normal S1 and S2.  There are no  murmurs, rubs, or gallops.  Distal pulses are intact and equal.  Lungs:   Lung sounds are clear and equal to auscultation throughout all lung fields without

## 2025-07-25 NOTE — PROGRESS NOTES
Chief Complaint   Patient presents with    Follow-Up from Hospital       \"Have you been to the ER, urgent care clinic since your last visit?  Hospitalized since your last visit?\"    [place] severe sepsis r/t Klebsiella, neutropenic fever    “Have you seen or consulted any other health care providers outside our system since your last visit?”    NO     “Have you had a pap smear?”    NO    Date of last Cervical Cancer screen (HPV or PAP): 6/7/2012

## 2025-08-14 ENCOUNTER — OFFICE VISIT (OUTPATIENT)
Facility: CLINIC | Age: 40
End: 2025-08-14
Payer: COMMERCIAL

## 2025-08-14 VITALS
HEIGHT: 66 IN | RESPIRATION RATE: 18 BRPM | WEIGHT: 178 LBS | TEMPERATURE: 97.2 F | HEART RATE: 71 BPM | BODY MASS INDEX: 28.61 KG/M2 | SYSTOLIC BLOOD PRESSURE: 115 MMHG | DIASTOLIC BLOOD PRESSURE: 73 MMHG | OXYGEN SATURATION: 98 %

## 2025-08-14 DIAGNOSIS — F41.8 SITUATIONAL ANXIETY: ICD-10-CM

## 2025-08-14 DIAGNOSIS — Z12.4 SCREENING FOR CERVICAL CANCER: Primary | ICD-10-CM

## 2025-08-14 RX ORDER — TRAZODONE HYDROCHLORIDE 50 MG/1
50 TABLET ORAL NIGHTLY
COMMUNITY
Start: 2025-08-13

## 2025-08-14 RX ORDER — LORAZEPAM 0.5 MG/1
0.5 TABLET ORAL EVERY 8 HOURS PRN
Qty: 15 TABLET | Refills: 0 | Status: SHIPPED | OUTPATIENT
Start: 2025-08-14 | End: 2025-08-19

## 2025-08-14 RX ORDER — BUTALBITAL, ACETAMINOPHEN AND CAFFEINE 50; 325; 40 MG/1; MG/1; MG/1
1 TABLET ORAL EVERY 4 HOURS PRN
COMMUNITY
Start: 2025-07-22

## 2025-08-14 RX ORDER — ACYCLOVIR 400 MG/1
400 TABLET ORAL 2 TIMES DAILY
COMMUNITY
Start: 2025-07-22

## 2025-08-14 ASSESSMENT — PATIENT HEALTH QUESTIONNAIRE - PHQ9
SUM OF ALL RESPONSES TO PHQ QUESTIONS 1-9: 0
1. LITTLE INTEREST OR PLEASURE IN DOING THINGS: NOT AT ALL
2. FEELING DOWN, DEPRESSED OR HOPELESS: NOT AT ALL

## 2025-08-22 LAB
CYTOLOGIST CVX/VAG CYTO: NORMAL
CYTOLOGY CVX/VAG DOC CYTO: NORMAL
CYTOLOGY CVX/VAG DOC THIN PREP: NORMAL
DX ICD CODE: NORMAL
HPV GENOTYPE REFLEX: NORMAL
HPV I/H RISK 4 DNA CVX QL PROBE+SIG AMP: NEGATIVE
Lab: NORMAL
OTHER STN SPEC: NORMAL
SERVICE CMNT-IMP: NORMAL
STAT OF ADQ CVX/VAG CYTO-IMP: NORMAL

## 2025-08-29 ENCOUNTER — HOSPITAL ENCOUNTER (EMERGENCY)
Age: 40
Discharge: HOME | End: 2025-08-29
Attending: FAMILY MEDICINE
Payer: COMMERCIAL

## 2025-08-29 ENCOUNTER — APPOINTMENT (OUTPATIENT)
Age: 40
End: 2025-08-29
Payer: COMMERCIAL

## 2025-08-29 ENCOUNTER — OFFICE VISIT (OUTPATIENT)
Facility: CLINIC | Age: 40
End: 2025-08-29
Payer: COMMERCIAL

## 2025-08-29 VITALS
WEIGHT: 180 LBS | RESPIRATION RATE: 18 BRPM | DIASTOLIC BLOOD PRESSURE: 80 MMHG | HEART RATE: 85 BPM | BODY MASS INDEX: 28.93 KG/M2 | TEMPERATURE: 98.9 F | OXYGEN SATURATION: 98 % | SYSTOLIC BLOOD PRESSURE: 120 MMHG | HEIGHT: 66 IN

## 2025-08-29 VITALS
OXYGEN SATURATION: 99 % | HEIGHT: 66 IN | HEART RATE: 86 BPM | RESPIRATION RATE: 18 BRPM | BODY MASS INDEX: 28.93 KG/M2 | DIASTOLIC BLOOD PRESSURE: 90 MMHG | WEIGHT: 180 LBS | TEMPERATURE: 98.7 F | SYSTOLIC BLOOD PRESSURE: 132 MMHG

## 2025-08-29 DIAGNOSIS — S63.501A SPRAIN OF RIGHT WRIST, INITIAL ENCOUNTER: Primary | ICD-10-CM

## 2025-08-29 DIAGNOSIS — S69.91XA INJURY OF RIGHT WRIST, INITIAL ENCOUNTER: ICD-10-CM
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
## 2025-08-29 DIAGNOSIS — M25.531 RIGHT WRIST PAIN: Primary | ICD-10-CM

## 2025-08-29 PROCEDURE — 1036F TOBACCO NON-USER: CPT | Performed by: NURSE PRACTITIONER

## 2025-08-29 PROCEDURE — 3074F SYST BP LT 130 MM HG: CPT | Performed by: NURSE PRACTITIONER

## 2025-08-29 PROCEDURE — G8428 CUR MEDS NOT DOCUMENT: HCPCS | Performed by: NURSE PRACTITIONER

## 2025-08-29 PROCEDURE — 99283 EMERGENCY DEPT VISIT LOW MDM: CPT

## 2025-08-29 PROCEDURE — 3079F DIAST BP 80-89 MM HG: CPT | Performed by: NURSE PRACTITIONER

## 2025-08-29 PROCEDURE — 73110 X-RAY EXAM OF WRIST: CPT

## 2025-08-29 PROCEDURE — 6370000000 HC RX 637 (ALT 250 FOR IP): Performed by: FAMILY MEDICINE

## 2025-08-29 PROCEDURE — G8417 CALC BMI ABV UP PARAM F/U: HCPCS | Performed by: NURSE PRACTITIONER

## 2025-08-29 PROCEDURE — 99213 OFFICE O/P EST LOW 20 MIN: CPT | Performed by: NURSE PRACTITIONER

## 2025-08-29 RX ORDER — QUIZARTINIB 17.7 MG/1
1 TABLET, FILM COATED ORAL DAILY
COMMUNITY

## 2025-08-29 RX ORDER — HYDROCODONE BITARTRATE AND ACETAMINOPHEN 5; 325 MG/1; MG/1
1 TABLET ORAL EVERY 4 HOURS PRN
Qty: 5 TABLET | Refills: 0 | Status: SHIPPED | OUTPATIENT
Start: 2025-08-29 | End: 2025-09-03

## 2025-08-29 RX ORDER — IBUPROFEN 400 MG/1
800 TABLET, FILM COATED ORAL ONCE
Status: COMPLETED | OUTPATIENT
Start: 2025-08-29 | End: 2025-08-29

## 2025-08-29 RX ORDER — ACETAMINOPHEN 500 MG
1000 TABLET ORAL
Status: COMPLETED | OUTPATIENT
Start: 2025-08-29 | End: 2025-08-29

## 2025-08-29 RX ADMIN — IBUPROFEN 800 MG: 400 TABLET, FILM COATED ORAL at 17:21

## 2025-08-29 RX ADMIN — ACETAMINOPHEN 1000 MG: 500 TABLET ORAL at 17:21

## 2025-08-29 ASSESSMENT — PAIN - FUNCTIONAL ASSESSMENT
PAIN_FUNCTIONAL_ASSESSMENT: 0-10
PAIN_FUNCTIONAL_ASSESSMENT: 0-10

## 2025-08-29 ASSESSMENT — PAIN SCALES - GENERAL
PAINLEVEL_OUTOF10: 7
PAINLEVEL_OUTOF10: 8

## 2025-08-29 ASSESSMENT — LIFESTYLE VARIABLES
HOW MANY STANDARD DRINKS CONTAINING ALCOHOL DO YOU HAVE ON A TYPICAL DAY: PATIENT DOES NOT DRINK
HOW OFTEN DO YOU HAVE A DRINK CONTAINING ALCOHOL: NEVER

## 2025-08-29 ASSESSMENT — PAIN DESCRIPTION - ORIENTATION
ORIENTATION: RIGHT
ORIENTATION: RIGHT

## 2025-08-29 ASSESSMENT — PAIN DESCRIPTION - LOCATION
LOCATION: WRIST
LOCATION: WRIST